# Patient Record
Sex: MALE | Race: WHITE | NOT HISPANIC OR LATINO | Employment: OTHER | URBAN - METROPOLITAN AREA
[De-identification: names, ages, dates, MRNs, and addresses within clinical notes are randomized per-mention and may not be internally consistent; named-entity substitution may affect disease eponyms.]

---

## 2017-07-25 ENCOUNTER — LAB VISIT (OUTPATIENT)
Dept: LAB | Facility: HOSPITAL | Age: 62
End: 2017-07-25
Attending: UROLOGY
Payer: COMMERCIAL

## 2017-07-25 ENCOUNTER — OFFICE VISIT (OUTPATIENT)
Dept: UROLOGY | Facility: CLINIC | Age: 62
End: 2017-07-25
Payer: COMMERCIAL

## 2017-07-25 VITALS
BODY MASS INDEX: 30.24 KG/M2 | HEART RATE: 61 BPM | SYSTOLIC BLOOD PRESSURE: 133 MMHG | WEIGHT: 216 LBS | HEIGHT: 71 IN | DIASTOLIC BLOOD PRESSURE: 70 MMHG | RESPIRATION RATE: 15 BRPM

## 2017-07-25 DIAGNOSIS — C61 PROSTATE CANCER: ICD-10-CM

## 2017-07-25 PROBLEM — E13.9 DIABETES MELLITUS DUE TO ABNORMAL INSULIN: Status: ACTIVE | Noted: 2017-07-25

## 2017-07-25 PROBLEM — L40.9 PSORIASIS: Status: ACTIVE | Noted: 2017-07-25

## 2017-07-25 PROBLEM — I10 HYPERTENSION: Status: ACTIVE | Noted: 2017-07-25

## 2017-07-25 LAB
CREAT SERPL-MCNC: 0.9 MG/DL
EST. GFR  (AFRICAN AMERICAN): >60 ML/MIN/1.73 M^2
EST. GFR  (NON AFRICAN AMERICAN): >60 ML/MIN/1.73 M^2

## 2017-07-25 PROCEDURE — 99999 PR PBB SHADOW E&M-NEW PATIENT-LVL IV: CPT | Mod: PBBFAC,,, | Performed by: UROLOGY

## 2017-07-25 PROCEDURE — 99205 OFFICE O/P NEW HI 60 MIN: CPT | Mod: S$GLB,,, | Performed by: UROLOGY

## 2017-07-25 PROCEDURE — 82565 ASSAY OF CREATININE: CPT

## 2017-07-25 PROCEDURE — 36415 COLL VENOUS BLD VENIPUNCTURE: CPT

## 2017-07-25 RX ORDER — METOPROLOL SUCCINATE 100 MG/1
100 TABLET, EXTENDED RELEASE ORAL DAILY
Refills: 1 | COMMUNITY
Start: 2017-07-10 | End: 2022-11-15

## 2017-07-25 RX ORDER — BLOOD-GLUCOSE METER
KIT MISCELLANEOUS
Refills: 3 | COMMUNITY
Start: 2017-07-10

## 2017-07-25 RX ORDER — SITAGLIPTIN AND METFORMIN HYDROCHLORIDE 500; 50 MG/1; MG/1
TABLET, FILM COATED ORAL
Refills: 1 | COMMUNITY
Start: 2017-07-10 | End: 2019-02-14

## 2017-07-25 RX ORDER — METHOTREXATE 2.5 MG/1
TABLET ORAL
Refills: 0 | COMMUNITY
Start: 2017-04-26 | End: 2020-11-23

## 2017-07-25 RX ORDER — TAMSULOSIN HYDROCHLORIDE 0.4 MG/1
CAPSULE ORAL
Refills: 3 | COMMUNITY
Start: 2017-07-10 | End: 2017-09-05

## 2017-07-25 RX ORDER — SERTRALINE HYDROCHLORIDE 50 MG/1
50 TABLET, FILM COATED ORAL DAILY
Refills: 1 | COMMUNITY
Start: 2017-07-10 | End: 2020-11-23

## 2017-07-25 RX ORDER — PRAVASTATIN SODIUM 40 MG/1
40 TABLET ORAL NIGHTLY
Refills: 1 | COMMUNITY
Start: 2017-07-10

## 2017-07-25 NOTE — PROGRESS NOTES
Clinic Note  7/25/2017      Subjective:         Chief Complaint:   CALEB Knott is a 62 y.o. male      No results found for: PSA, PSADIAG, PSATOTAL, PSAFREE, PSAFREEPCT   No past medical history on file.  No family history on file.  Social History     Social History    Marital status:      Spouse name: N/A    Number of children: N/A    Years of education: N/A     Occupational History    Not on file.     Social History Main Topics    Smoking status: Not on file    Smokeless tobacco: Not on file    Alcohol use Not on file    Drug use: Unknown    Sexual activity: Not on file     Other Topics Concern    Not on file     Social History Narrative    No narrative on file     No past surgical history on file.  Patient Active Problem List   Diagnosis    Prostate cancer     Review of Systems      Objective:      There were no vitals taken for this visit.  There is no height or weight on file to calculate BMI.  Physical Exam      Assessment and Plan:           Problem List Items Addressed This Visit     Prostate cancer      Other Visit Diagnoses    None.         Follow up:       Nickolas Kim

## 2017-07-25 NOTE — PROGRESS NOTES
Clinic Note  7/25/2017      Subjective:         Chief Complaint:   CALEB Knott is a 62 y.o. male recently diagnosed with prostate cancer.Here with his wife Xiomara. Friend of Carlton Flores.  Owns machine shop. Stopped smoking 3 years ago. Rides bike several times a week. BPH responded to Flomax.    Stage- T2a  PSAi- 6.7  PSAD- 0.35  Volume- 23 ccs  Biopsy (7/20/2017)- Right side [place] 3+4 4/6 positive ( % involvement); left benign. Overall 4/12 cores positive  TRISH score- 3  Intermediate risk tumor    No results found for: PSA, PSADIAG, PSATOTAL, PSAFREE, PSAFREEPCT   No past medical history on file.  No family history on file.  Social History     Social History    Marital status:      Spouse name: N/A    Number of children: N/A    Years of education: N/A     Occupational History    Not on file.     Social History Main Topics    Smoking status: Not on file    Smokeless tobacco: Not on file    Alcohol use Not on file    Drug use: Unknown    Sexual activity: Not on file     Other Topics Concern    Not on file     Social History Narrative    No narrative on file     No past surgical history on file.  Patient Active Problem List   Diagnosis    Prostate cancer     Review of Systems   Constitutional: Negative for appetite change, chills, fatigue, fever and unexpected weight change.   HENT: Negative for nosebleeds.    Respiratory: Negative for shortness of breath and wheezing.    Cardiovascular: Negative for chest pain, palpitations and leg swelling.   Gastrointestinal: Negative for abdominal distention, abdominal pain, constipation, diarrhea, nausea and vomiting.   Genitourinary: Positive for difficulty urinating. Negative for dysuria and hematuria.   Musculoskeletal: Negative for arthralgias and back pain.   Skin: Negative for pallor.   Neurological: Negative for dizziness, seizures and syncope.   Hematological: Negative for adenopathy.   Psychiatric/Behavioral: Negative for dysphoric  mood.         Objective:      There were no vitals taken for this visit.  There is no height or weight on file to calculate BMI.  Physical Exam   Constitutional: He is oriented to person, place, and time. He appears well-developed and well-nourished. No distress.   HENT:   Head: Atraumatic.   Neck: No tracheal deviation present.   Cardiovascular: Normal rate.    Pulmonary/Chest: Effort normal. No respiratory distress. He has no wheezes.   Abdominal: Soft. Bowel sounds are normal. He exhibits no distension and no mass. There is no tenderness. There is no rebound and no guarding.   Genitourinary: Rectum normal. Rectal exam shows no external hemorrhoid, no internal hemorrhoid, no mass and no tenderness.       Neurological: He is alert and oriented to person, place, and time.   Skin: Skin is warm and dry. He is not diaphoretic.     Psychiatric: He has a normal mood and affect. His behavior is normal. Judgment and thought content normal.         Assessment and Plan:           Problem List Items Addressed This Visit     Prostate cancer      Other Visit Diagnoses    None.         Follow up:   Today's visit was spent almost entirely on counseling. We reviewed his diagnosis, stage, grade, risk group, and prognosis. We discussed [place] and TRISH risk stratification We reviewed the [facility] nomogram. We discussed the concept of low risk, moderate risk, and high risk disease. We discussed the different treatment options including active surveillance (as well as the surveillance protocol), prostate brachytherapy, IMRT, IGRT, cryotherapy, and both open and robotic prostatectomy.We also discussed the advantages, disadvantages, risks and benefits, as well as complications of each option. Regarding radiation therapy we discussed treatment planning, the different techniques, short and long term complications. These included radiation cystitis, radiation proctitis, and impotence. We discussed success, failure, and salvage  therapeutic options. We discussed surgical therapy in depth including preoperative preparation, surgical technique (including bladder neck and nerve-sparing techniques), postoperative recuperation and recovery, and short and long term complications including UTI, bleeding, blood clots,catheter dislodgement, etc. We discussed the risks of reoperation, incontinence, impotence, and recurrence. We discussed preop and postop Kegels, post op penile rehab, and treatment options for incontinence and impotence. We discussed rates of cancer free survival and recurrence, as well as salvage therapeutic options. We discussed the possible  indications for adjuvant radiation therapy. I answered questions and addressed concerns.   Discussed MRI and Prolaris test.  Need outside slides for Prolaris test.  I spent 30 minutes with the patient. Over 50% of the visit was spent in counseling.   primary care physician- Mike Larson in [facility], [phone].  My nurse will instruct the patient on Kegel exercises today and give them the Kegel exercise instruction sheet.   Nickolas Kim

## 2017-08-03 ENCOUNTER — TELEPHONE (OUTPATIENT)
Dept: UROLOGY | Facility: CLINIC | Age: 62
End: 2017-08-03

## 2017-08-03 ENCOUNTER — OFFICE VISIT (OUTPATIENT)
Dept: UROLOGY | Facility: CLINIC | Age: 62
End: 2017-08-03
Payer: COMMERCIAL

## 2017-08-03 ENCOUNTER — HOSPITAL ENCOUNTER (OUTPATIENT)
Dept: RADIOLOGY | Facility: HOSPITAL | Age: 62
Discharge: HOME | End: 2017-08-03
Attending: UROLOGY
Payer: COMMERCIAL

## 2017-08-03 VITALS
DIASTOLIC BLOOD PRESSURE: 63 MMHG | BODY MASS INDEX: 30.74 KG/M2 | WEIGHT: 219.56 LBS | HEIGHT: 71 IN | SYSTOLIC BLOOD PRESSURE: 109 MMHG | HEART RATE: 64 BPM | RESPIRATION RATE: 15 BRPM

## 2017-08-03 DIAGNOSIS — C61 PROSTATE CANCER: Primary | ICD-10-CM

## 2017-08-03 DIAGNOSIS — C61 PROSTATE CANCER: ICD-10-CM

## 2017-08-03 PROCEDURE — 72197 MRI PELVIS W/O & W/DYE: CPT | Mod: TC

## 2017-08-03 PROCEDURE — 99213 OFFICE O/P EST LOW 20 MIN: CPT | Mod: S$GLB,,, | Performed by: UROLOGY

## 2017-08-03 PROCEDURE — 72197 MRI PELVIS W/O & W/DYE: CPT | Mod: 26,,, | Performed by: RADIOLOGY

## 2017-08-03 PROCEDURE — 99999 PR PBB SHADOW E&M-EST. PATIENT-LVL III: CPT | Mod: PBBFAC,,, | Performed by: UROLOGY

## 2017-08-03 PROCEDURE — 25500020 PHARM REV CODE 255: Performed by: UROLOGY

## 2017-08-03 PROCEDURE — A9585 GADOBUTROL INJECTION: HCPCS | Performed by: UROLOGY

## 2017-08-03 PROCEDURE — 88321 CONSLTJ&REPRT SLD PREP ELSWR: CPT | Mod: ,,, | Performed by: PATHOLOGY

## 2017-08-03 PROCEDURE — 3008F BODY MASS INDEX DOCD: CPT | Mod: S$GLB,,, | Performed by: UROLOGY

## 2017-08-03 RX ORDER — GADOBUTROL 604.72 MG/ML
10 INJECTION INTRAVENOUS
Status: COMPLETED | OUTPATIENT
Start: 2017-08-03 | End: 2017-08-03

## 2017-08-03 RX ADMIN — GADOBUTROL 10 ML: 604.72 INJECTION INTRAVENOUS at 07:08

## 2017-08-03 NOTE — LETTER
August 3, 2017        Mike Larson MD  [address] [facility] [address]             [facility]  [address]  [place] [address]  Phone: [phone]   Patient: Bert Knott   MR Number: [id number]   YOB: 1955   Date of Visit: 8/3/2017       Dear Dr. Larson:    Thank you for referring Bert Knott to me for evaluation. Attached you will find relevant portions of my assessment and plan of care.    If you have questions, please do not hesitate to call me. I look forward to following Bert Knott along with you.    Sincerely,      Nickolas Kim MD            CC  No Recipients    Enclosure

## 2017-08-03 NOTE — PROGRESS NOTES
Clinic Note  8/3/2017      Subjective:         Chief Complaint:   CALEB Knott is a 62 y.o. male recently diagnosed with prostate cancer.Here with his wife Xiomara. Friend of Carlton Flores.  Owns machine shop. Stopped smoking 3 years ago. Rides bike several times a week. BPH responded to Flomax.     Stage- T2a  PSAi- 6.7  PSAD- 0.35  Volume- 23 ccs  Biopsy (7/20/2017)- Right side [place] 3+4 4/6 positive ( % involvement); left benign. Overall 4/12 cores positive  TRISH score- 3  Intermediate risk tumor    MRI- no nodes, extracapsular extension, SV involvement.      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 7/25/2017    Elevated PSA     Hypertension      Family History   Problem Relation Age of Onset    Hypertension Father     Cancer Mother     Cancer Paternal Grandmother     Prostate cancer Paternal Grandfather     Cancer Maternal Grandmother      Social History     Social History    Marital status:      Spouse name: N/A    Number of children: N/A    Years of education: N/A     Occupational History          Social History Main Topics    Smoking status: Former Smoker     Packs/day: 1.50     Types: Vaping w/o nicotine, Cigarettes     Start date: 1972     Quit date: 2014    Smokeless tobacco: Not on file    Alcohol use No    Drug use: No    Sexual activity: Yes     Partners: Female     Other Topics Concern    Not on file     Social History Narrative    No narrative on file     Past Surgical History:   Procedure Laterality Date    BACK SURGERY  1974    lamputation of little finger tip  1979    MOUTH SURGERY  1972    tonsilectomy  1960     Patient Active Problem List   Diagnosis    Prostate cancer    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   HENT: Negative for nosebleeds.    Respiratory: Negative for shortness of breath and wheezing.    Cardiovascular: Negative for chest pain, palpitations and leg swelling.   Gastrointestinal: Negative for abdominal distention, abdominal pain, constipation, diarrhea, nausea and vomiting.   Musculoskeletal: Negative for arthralgias and back pain.   Skin: Negative for pallor.   Neurological: Negative for dizziness, seizures and syncope.   Hematological: Negative for adenopathy.   Psychiatric/Behavioral: Negative for dysphoric mood.         Objective:      There were no vitals taken for this visit.  Estimated body mass index is 30.13 kg/m² as calculated from the following:    Height as of 7/25/17: 5' 11" (1.803 m).    Weight as of 7/25/17: 98 kg (216 lb).  Physical Exam      Assessment and Plan:           Problem List Items Addressed This Visit     Prostate cancer - Primary      Other Visit Diagnoses    None.         Follow up:   Today's visit was spent almost entirely on counseling. We reviewed his diagnosis, stage, grade, risk group, and prognosis. We discussed D'[place] and TRISH risk stratification We reviewed the [facility] nomogram. We discussed the concept of low risk, moderate risk, and high risk disease. We discussed the different treatment options including active surveillance (as well as the surveillance protocol), prostate brachytherapy, IMRT, IGRT, cryotherapy, and both open and robotic prostatectomy.We also discussed the advantages, disadvantages, risks and benefits, as well as complications of each option. Regarding radiation therapy we discussed treatment planning, the different techniques, short and long term complications. These included radiation cystitis, radiation proctitis, and impotence. We discussed success, failure, and salvage therapeutic options. We discussed surgical " therapy in depth including preoperative preparation, surgical technique (including bladder neck and nerve-sparing techniques), postoperative recuperation and recovery, and short and long term complications including UTI, bleeding, blood clots,catheter dislodgement, etc. We discussed the risks of reoperation, incontinence, impotence, and recurrence. We discussed preop and postop Kegels, post op penile rehab, and treatment options for incontinence and impotence. We discussed rates of cancer free survival and recurrence, as well as salvage therapeutic options. We discussed the possible  indications for adjuvant radiation therapy. I answered questions and addressed concerns.   Patient interested in robotic assisted laparoscopic prostatectomy. Will call with [facility].  The patient will meet with our  Priscila today to find a date for surgery and begin preparation for surgery. Will also receive our handout on NPO guidelines and preop hydration. Patient will also receive information and education on preoperative skin preparation and postop wound care.  Xiomara is concerned that his Zoloft dose may be adjusted. Recommend he discuss with Dr. Larson.  Letter to Dr. Larson.  I spent 25 minutes with the patient of which more than half was spent in direct consultation with the patient in regards to our treatment and plan.         Nickolas Kim

## 2017-08-22 ENCOUNTER — OFFICE VISIT (OUTPATIENT)
Dept: UROLOGY | Facility: CLINIC | Age: 62
End: 2017-08-22
Payer: COMMERCIAL

## 2017-08-22 ENCOUNTER — HOSPITAL ENCOUNTER (OUTPATIENT)
Dept: CARDIOLOGY | Facility: CLINIC | Age: 62
Discharge: HOME | End: 2017-08-22
Payer: COMMERCIAL

## 2017-08-22 ENCOUNTER — ANESTHESIA EVENT (OUTPATIENT)
Dept: SURGERY | Facility: HOSPITAL | Age: 62
DRG: 708 | End: 2017-08-22
Payer: COMMERCIAL

## 2017-08-22 ENCOUNTER — HOSPITAL ENCOUNTER (OUTPATIENT)
Dept: PREADMISSION TESTING | Facility: HOSPITAL | Age: 62
Discharge: HOME | End: 2017-08-22
Attending: ANESTHESIOLOGY
Payer: COMMERCIAL

## 2017-08-22 VITALS
BODY MASS INDEX: 30.35 KG/M2 | WEIGHT: 216.81 LBS | DIASTOLIC BLOOD PRESSURE: 83 MMHG | HEART RATE: 57 BPM | HEIGHT: 71 IN | OXYGEN SATURATION: 99 % | RESPIRATION RATE: 18 BRPM | SYSTOLIC BLOOD PRESSURE: 165 MMHG | TEMPERATURE: 98 F

## 2017-08-22 DIAGNOSIS — Z01.812 PRE-PROCEDURE LAB EXAM: ICD-10-CM

## 2017-08-22 DIAGNOSIS — Z01.818 PREOP TESTING: Primary | ICD-10-CM

## 2017-08-22 DIAGNOSIS — Z01.818 PREOP TESTING: ICD-10-CM

## 2017-08-22 DIAGNOSIS — C61 PROSTATE CANCER: ICD-10-CM

## 2017-08-22 DIAGNOSIS — E08.9 DIABETES MELLITUS DUE TO UNDERLYING CONDITION WITHOUT COMPLICATION, WITHOUT LONG-TERM CURRENT USE OF INSULIN: Primary | ICD-10-CM

## 2017-08-22 PROCEDURE — 99999 PR PBB SHADOW E&M-EST. PATIENT-LVL II: CPT | Mod: PBBFAC,,,

## 2017-08-22 PROCEDURE — 93000 ELECTROCARDIOGRAM COMPLETE: CPT | Mod: S$GLB,,, | Performed by: INTERNAL MEDICINE

## 2017-08-22 PROCEDURE — 99499 UNLISTED E&M SERVICE: CPT | Mod: S$GLB,,, | Performed by: STUDENT IN AN ORGANIZED HEALTH CARE EDUCATION/TRAINING PROGRAM

## 2017-08-22 NOTE — ANESTHESIA PREPROCEDURE EVALUATION
Anesthesia Assessment: Preoperative EQUATION     Planned Procedure: Procedure(s) (LRB):  ROBOTIC ASSISTED LAPAROSCOPIC PROSTATECTOMY (N/A)  Requested Anesthesia Type:General  Surgeon: Nickolas Kim MD  Service: Urology  Known or anticipated Date of Surgery:8/28/2017     Surgeon notes: reviewed     Electronic QUestionnaire Assessment completed via nurse interview with patient     NO AQ     Triage considerations:      The patient has no apparent active cardiac condition (No unstable coronary Syndrome such as severe unstable angina or recent [<1 month] myocardial infarction, decompensated CHF, severe valvular   disease or significant arrhythmia)     Previous anesthesia records:GETA and Not available     Last PCP note: outside [facility]      Other important co-morbidities:  COPD, DM-2, HTN     Tests already available:  Available tests,  within 3 months , within [facility] .  7/2017 UA, Cr                                                Instructions given. (See in Nurse's note)     Optimization:  Anesthesia Preop Clinic Assessment  Indicated-Indicated for this surgery                          Plan:              Testing:  Hematology Profile, CMP, A1C, T&S and EKG   Pre-anesthesia  visit  8/22                                                                    Visit focus: concerns in complex and/or prolonged anesthesia                                                                                              Patient  has previously scheduled Medical Appointment: 8/22 Pre-op Urology     Navigation: Tests Scheduled. Heme Profile, CMP, A1C, T&S, EKG  8/22                                              Results will be tracked by Preop Clinic.     Malaika Aguilar RN                                                                                                                 08/22/2017  Pre-operative evaluation for Procedure(s) (LRB):  ROBOTIC ASSISTED LAPAROSCOPIC PROSTATECTOMY (N/A)    Bert Knott is a 62 y.o. male  former smoker with PMH of COPD, DM II, HTN, Psoriasis, and recent diagnosis of prostate cancer who presents for above procedure.       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    Hypertension    Psoriasis       Review of patient's allergies indicates:   Allergen Reactions    Penicillins Rash    Wellbutrin [bupropion hcl] Rash        Current Outpatient Prescriptions on File Prior to Encounter   Medication Sig Dispense Refill    FREESTYLE LITE STRIPS Strp TEST BLOOD SUGAR D  3    JANUMET  mg per tablet TK 1 T PO BID  1    methotrexate 2.5 MG Tab TK 10 TS PO WEEKLY  0    metoprolol succinate (TOPROL-XL) 100 MG 24 hr tablet TK 1 T PO D  1    pravastatin (PRAVACHOL) 40 MG tablet TK 1 T PO D  1    sertraline (ZOLOFT) 50 MG tablet TK 1 T PO D  1    tamsulosin (FLOMAX) 0.4 mg Cp24 TK 1 C PO D  3     No current facility-administered medications on file prior to encounter.        Past Surgical History:   Procedure Laterality Date    BACK SURGERY  1974    lamputation of little finger tip  1979    MOUTH SURGERY  1972    tonsilectomy  1960       Social History     Social History    Marital status:      Spouse name: N/A    Number of children: N/A    Years of education: N/A     Occupational History          Social History Main Topics    Smoking status: Former Smoker     Packs/day: 1.50     Types: Cigarettes, Vaping with nicotine     Start date: 1972     Quit date: 2014    Smokeless tobacco: Not on file    Alcohol use No    Drug use: No    Sexual activity: Yes     Partners: Female     Other Topics Concern    Not on file     Social History Narrative    No narrative on file         Vital Signs Range (Last 24H):         CBC:   Recent Labs      08/22/17   1405   WBC  6.15   RBC  4.36*   HGB  14.1   HCT  40.2   PLT  271   MCV  92   MCH  32.3*   MCHC  35.1       CMP:   Recent Labs      08/22/17    1405   NA  142   K  4.5   CL  104   CO2  31*   BUN  14   CREATININE  1.0   GLU  131*   CALCIUM  9.8   ALBUMIN  4.0   PROT  7.3   ALKPHOS  77   ALT  45*   AST  35   BILITOT  1.0       INR  No results for input(s): INR, PROTIME, APTT in the last 72 hours.    Invalid input(s): PT      EKG:   Sinus bradycardia- 50  Otherwise normal ECG  No previous ECGs available  Confirmed by JOHANN MCKEON MD ([id number]) on 8/22/2017 8:05:09 PM    2D Echo: None        Anesthesia Evaluation    I have reviewed the Patient Summary Reports.    I have reviewed the Nursing Notes.      Review of Systems  Anesthesia Hx:  No problems with previous Anesthesia  History of prior surgery of interest to airway management or planning: Denies Family Hx of Anesthesia complications.   Denies Personal Hx of Anesthesia complications.   Social:  Former Smoker    Hematology/Oncology:         -- Denies Anemia: Current/Recent Cancer. Other (see Oncology comments)   EENT/Dental:EENT/Dental Normal   Cardiovascular:   Hypertension, well controlled Denies MI.  Denies CAD.     Denies Angina.            Pulmonary:   COPD, mild Denies Asthma.  Denies Shortness of breath.  Denies Sleep Apnea.    Renal/:   Denies Chronic Renal Disease.     Hepatic/GI:   Denies GERD. Denies Liver Disease.    Neurological:   Denies CVA. Denies Seizures. - reported single seizure in 1998 and told by his physician it was secondary to lack of sleep.    Endocrine:   Diabetes, well controlled, type 2    Psych:   anxiety - panic attack on friday         Physical Exam  General:  Well nourished    Airway/Jaw/Neck:  Airway Findings: Mouth Opening: Normal Tongue: Normal  General Airway Assessment: Adult  Mallampati: III  Improves to III, II with phonation.  TM Distance: 4 - 6 cm  Jaw/Neck Findings:     Neck ROM: Normal ROM     Eyes/Ears/Nose:  EYES/EARS/NOSE FINDINGS: Normal   Dental:  Dental Findings: upper front caps, In tact   Chest/Lungs:  Chest/Lungs Clear    Heart/Vascular:  Heart Findings:  Normal Heart murmur: negative       Mental Status:  Mental Status Findings:  Cooperative, Alert and Oriented         Anesthesia Plan  Type of Anesthesia, risks & benefits discussed:  Anesthesia Type:  general  Patient's Preference:   Intra-op Monitoring Plan: standard ASA monitors  Intra-op Monitoring Plan Comments:   Post Op Pain Control Plan: multimodal analgesia, IV/PO Opioids PRN and per primary service following discharge from PACU  Post Op Pain Control Plan Comments:   Induction:   IV  Beta Blocker:  Patient is on a Beta-Blocker and has received one dose within the past 24 hours (No further documentation required).       Informed Consent: Patient understands risks and agrees with Anesthesia plan.  Questions answered. Anesthesia consent signed with patient.  ASA Score: 3     Day of Surgery Review of History & Physical: I have interviewed and examined the patient. I have reviewed the patient's H&P dated:   Significant changes noted: Surgeon notified.  H&P update referred to the surgeon.         Ready For Surgery From Anesthesia Perspective.

## 2017-08-22 NOTE — PRE ADMISSION SCREENING
Anesthesia Assessment: Preoperative EQUATION    Planned Procedure: Procedure(s) (LRB):  ROBOTIC ASSISTED LAPAROSCOPIC PROSTATECTOMY (N/A)  Requested Anesthesia Type:General  Surgeon: Nickolas Kim MD  Service: Urology  Known or anticipated Date of Surgery:8/28/2017    Surgeon notes: reviewed    Electronic QUestionnaire Assessment completed via nurse interview with patient    NO AQ    Triage considerations:     The patient has no apparent active cardiac condition (No unstable coronary Syndrome such as severe unstable angina or recent [<1 month] myocardial infarction, decompensated CHF, severe valvular   disease or significant arrhythmia)    Previous anesthesia records:GETA and Not available    Last PCP note: outside [facility]     Other important co-morbidities:  COPD, DM-2, HTN     Tests already available:  Available tests,  within 3 months , within [facility] .  7/2017 UA, Cr            Instructions given. (See in Nurse's note)    Optimization:  Anesthesia Preop Clinic Assessment  Indicated-Indicated for this surgery        Plan:    Testing:  Hematology Profile, CMP, A1C, T&S and EKG   Pre-anesthesia  visit  8/22     Visit focus: concerns in complex and/or prolonged anesthesia          Patient  has previously scheduled Medical Appointment: 8/22 Pre-op Urology    Navigation: Tests Scheduled. Heme Profile, CMP, A1C, T&S, EKG  8/22             Results will be tracked by Preop Clinic.  8/23 Lab, EKG reviewed    Malaika Aguilar RN

## 2017-08-22 NOTE — DISCHARGE INSTRUCTIONS
Your surgery has been scheduled for:___________8/28/2017_______________________________    You should report to:  ____[facility], located on the [place] side of the first floor of the           [facility] ([phone])  _X___[facility], located on the [facility] side of the            Second floor of the [facility] ([phone])  ____3rd Floor [facility] located on the [facility] side of the [facility] [phone]  Please Note   - Tell your doctor if you take Aspirin, products containing Aspirin, herbal medications  or blood thinners, such as Coumadin, Ticlid, or Plavix.  (Consult your provider regarding holding or stopping before surgery).  - Arrange for someone to drive you home following surgery.  You will not be allowed to leave the surgical facility alone or drive yourself home following sedation and anesthesia.  Before Surgery  - Stop taking all herbal medications 14days prior to surgery  - No Motrin/Advil (Ibuprofen) 7 days before surgery  - No Aleve (Naproxen) 7 days before surgery  - Stop Taking Asprin, products containing Asprin __7___days before surgery  - Stop taking blood thinners___N/A____days before surgery  - Refrain from drinking alcoholic beverages for 24hours before and after surgery  - Stop or limit smoking ____5_____days before surgery  Night before Surgery  - DO NOT EAT OR DRINK ANYTHING AFTER MIDNIGHT, INCLUDING GUM, HARD CANDY, MINTS, OR CHEWING TOBACCO.  - Take a shower or bath (shower is recommended).  Bathe with Hibiclens soap or an antibacterial soap from the neck down.  If not supplied by your surgeon, hibiclens soap will need to be purchased over the counter in pharmacy.  Rinse soap off thoroughly.  - Shampoo your hair with your regular shampoo  The Day of Surgery  - Take another bath or shower with hibiclens or any antibacterial soap, to reduce the chance of infection.  - Take heart  and blood pressure medications with a small sip of water, as advised by the perioperative team.  - Do not take fluid pills  - You may brush your teeth and rinse your mouth, but do not swall any additional water.   - Do not apply perfumes, powder, body lotions or deodorant on the day of surgery.  - Do not wear makeup or moisturizer  - Wear comfortable clothes, such as a button front shirt and loose fitting pants.  - Leave all jewelry, including body piercings, and valuables at home.    - Bring any devices you will neeed after surgery such as crutches or canes.  - If you have sleep apnea, please bring your CPAP machine  In the event that your physical condition changes including the onset of a cold or respiratory illness, or if you have to delay or cancel your surgery, please notify your surgeon.

## 2017-08-22 NOTE — PROGRESS NOTES
Urology ([facility]) H&P for upcoming procedure  Staff:  Dr. Nickolas Kim MD    Is this patient in a research study?  No    CC: prostate adencarcinoma    HPI:  Bert Knott is a 62 y.o. male with history of DM, HTN who was found to have [id number] [place] 3+4=7 prostate adenocarcinoma. He is continent and does have some urinary symptoms, most notably frequency q2h during the day and nocturia 3x/night. He says this is much improved from previous now that he is on flomax. He does have mild ED and low libido. He says he is taking cialis which helps him. He does not have any bone pain, chest pain or shortness of breath.     The patient initially presented with elevated PSA.  TRUS biopsy revealed the following:        LEFT   RIGHT  BASE   benign   0/2, high grade intraepithelial neoplasia  MID   benign   2/2, (3+4), 100%  APEX   benign   2/2, (3+4), 65%    Date of Biopsy: 7/20/2017  PSA: 6.7  Volume: 23 g  Voiding complaints: present  ED: present  Incontinence: absent    ROS:  Neg except per HPI, specifically no bone pain, no unintentional weight loss, no anorexia, no night sweats    Past Medical History:   Diagnosis Date    Allergy     COPD (chronic obstructive pulmonary disease)     Diabetes mellitus     Diabetes mellitus due to abnormal insulin 7/25/2017    Elevated PSA     Hypertension        Past Surgical History:   Procedure Laterality Date    BACK SURGERY  1974    lamputation of little finger tip  1979    MOUTH SURGERY  1972    tonsilectomy  1960       Social History     Social History    Marital status:      Spouse name: N/A    Number of children: N/A    Years of education: N/A     Occupational History          Social History Main Topics    Smoking status: Former Smoker     Packs/day: 1.50     Types: Cigarettes, Vaping with nicotine     Start date: 1972     Quit date: 2014    Smokeless tobacco: Not on file    Alcohol use No    Drug use: No    Sexual activity: Yes      Partners: Female     Other Topics Concern    Not on file     Social History Narrative    No narrative on file       Family History   Problem Relation Age of Onset    Hypertension Father     Cancer Mother     Cancer Paternal Grandmother     Prostate cancer Paternal Grandfather     Cancer Maternal Grandmother        Review of patient's allergies indicates:   Allergen Reactions    Penicillins Rash    Wellbutrin [bupropion hcl] Rash       Current Outpatient Prescriptions on File Prior to Visit   Medication Sig Dispense Refill    FREESTYLE LITE STRIPS Strp TEST BLOOD SUGAR D  3    JANUMET  mg per tablet TK 1 T PO BID  1    methotrexate 2.5 MG Tab TK 10 TS PO WEEKLY  0    metoprolol succinate (TOPROL-XL) 100 MG 24 hr tablet TK 1 T PO D  1    pravastatin (PRAVACHOL) 40 MG tablet TK 1 T PO D  1    sertraline (ZOLOFT) 50 MG tablet TK 1 T PO D  1    tamsulosin (FLOMAX) 0.4 mg Cp24 TK 1 C PO D  3     No current facility-administered medications on file prior to visit.        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    Lab Results   Component Value Date    WBC 6.15 08/22/2017    HGB 14.1 08/22/2017    HCT 40.2 08/22/2017    MCV 92 08/22/2017     08/22/2017       BMP  Lab Results   Component Value Date    CREATININE 0.9 07/25/2017    ESTGFRAFRICA >60.0 07/25/2017    EGFRNONAA >60.0 07/25/2017       No results found for: PSA, PSADIAG, PSATOTAL, PSAFREE, PSAFREEPCT    Imaging:  Prostate MRI 8/3/17: T2 hypointense lesion on right mid peripheral prostate gland,  PIRADS 5 lesion, no extracapsular extension, no lymphadenopathy    Assessment: Bert Knott is a 62 y.o. male with [id number] [place] 3+4=7 prostate adenocarcinoma.      Plan:   1. To OR on 8/28/2017 for RALP without BPLND  2. Consents signed   3. I have explained the risk, benefits, and alternatives of the procedure in detail. The patient voices understanding and all questions have been answered. The patient agrees to proceed as planned.       Alexandro Ayala MD

## 2017-08-25 ENCOUNTER — TELEPHONE (OUTPATIENT)
Dept: UROLOGY | Facility: CLINIC | Age: 62
End: 2017-08-25

## 2017-08-28 ENCOUNTER — HOSPITAL ENCOUNTER (INPATIENT)
Facility: HOSPITAL | Age: 62
LOS: 1 days | Discharge: HOME | DRG: 708 | End: 2017-08-29
Attending: UROLOGY | Admitting: UROLOGY
Payer: COMMERCIAL

## 2017-08-28 ENCOUNTER — ANESTHESIA (OUTPATIENT)
Dept: SURGERY | Facility: HOSPITAL | Age: 62
DRG: 708 | End: 2017-08-28
Payer: COMMERCIAL

## 2017-08-28 ENCOUNTER — SURGERY (OUTPATIENT)
Age: 62
End: 2017-08-28

## 2017-08-28 DIAGNOSIS — C61 PROSTATE CANCER: ICD-10-CM

## 2017-08-28 LAB
ABO + RH BLD: NORMAL
ALBUMIN SERPL BCP-MCNC: 3.4 G/DL
ALP SERPL-CCNC: 59 U/L
ALT SERPL W/O P-5'-P-CCNC: 22 U/L
ANION GAP SERPL CALC-SCNC: 8 MMOL/L
AST SERPL-CCNC: 16 U/L
BASOPHILS # BLD AUTO: 0.01 K/UL
BASOPHILS NFR BLD: 0.1 %
BILIRUB SERPL-MCNC: 0.7 MG/DL
BLD GP AB SCN CELLS X3 SERPL QL: NORMAL
BUN SERPL-MCNC: 13 MG/DL
CALCIUM SERPL-MCNC: 8.2 MG/DL
CHLORIDE SERPL-SCNC: 109 MMOL/L
CO2 SERPL-SCNC: 22 MMOL/L
CREAT SERPL-MCNC: 1.1 MG/DL
DIFFERENTIAL METHOD: ABNORMAL
EOSINOPHIL # BLD AUTO: 0 K/UL
EOSINOPHIL NFR BLD: 0 %
ERYTHROCYTE [DISTWIDTH] IN BLOOD BY AUTOMATED COUNT: 13.4 %
EST. GFR  (AFRICAN AMERICAN): >60 ML/MIN/1.73 M^2
EST. GFR  (NON AFRICAN AMERICAN): >60 ML/MIN/1.73 M^2
GLUCOSE SERPL-MCNC: 180 MG/DL (ref 70–110)
GLUCOSE SERPL-MCNC: 276 MG/DL
HCO3 UR-SCNC: 20.2 MMOL/L (ref 24–28)
HCT VFR BLD AUTO: 34.4 %
HCT VFR BLD CALC: 33 %PCV (ref 36–54)
HGB BLD-MCNC: 12.1 G/DL
LYMPHOCYTES # BLD AUTO: 0.5 K/UL
LYMPHOCYTES NFR BLD: 4.7 %
MCH RBC QN AUTO: 31.9 PG
MCHC RBC AUTO-ENTMCNC: 35.2 G/DL
MCV RBC AUTO: 91 FL
MONOCYTES # BLD AUTO: 0.8 K/UL
MONOCYTES NFR BLD: 7.8 %
NEUTROPHILS # BLD AUTO: 8.9 K/UL
NEUTROPHILS NFR BLD: 87.2 %
PCO2 BLDA: 36.7 MMHG (ref 35–45)
PH SMN: 7.35 [PH] (ref 7.35–7.45)
PLATELET # BLD AUTO: 216 K/UL
PMV BLD AUTO: 10.4 FL
PO2 BLDA: 100 MMHG (ref 80–100)
POC BE: -5 MMOL/L
POC IONIZED CALCIUM: 1.12 MMOL/L (ref 1.06–1.42)
POC SATURATED O2: 97 % (ref 95–100)
POC TCO2: 21 MMOL/L (ref 23–27)
POCT GLUCOSE: 167 MG/DL (ref 70–110)
POCT GLUCOSE: 210 MG/DL (ref 70–110)
POCT GLUCOSE: 222 MG/DL (ref 70–110)
POTASSIUM BLD-SCNC: 4 MMOL/L (ref 3.5–5.1)
POTASSIUM SERPL-SCNC: 4.7 MMOL/L
PROT SERPL-MCNC: 6 G/DL
RBC # BLD AUTO: 3.79 M/UL
SAMPLE: ABNORMAL
SODIUM BLD-SCNC: 140 MMOL/L (ref 136–145)
SODIUM SERPL-SCNC: 139 MMOL/L
WBC # BLD AUTO: 10.24 K/UL

## 2017-08-28 PROCEDURE — 0TBC4ZX EXCISION OF BLADDER NECK, PERCUTANEOUS ENDOSCOPIC APPROACH, DIAGNOSTIC: ICD-10-PCS | Performed by: UROLOGY

## 2017-08-28 PROCEDURE — 71000039 HC RECOVERY, EACH ADD'L HOUR: Performed by: UROLOGY

## 2017-08-28 PROCEDURE — P9045 ALBUMIN (HUMAN), 5%, 250 ML: HCPCS

## 2017-08-28 PROCEDURE — 38571 LAPAROSCOPY LYMPHADENECTOMY: CPT | Mod: 51,,, | Performed by: UROLOGY

## 2017-08-28 PROCEDURE — 0VT34ZZ RESECTION OF BILATERAL SEMINAL VESICLES, PERCUTANEOUS ENDOSCOPIC APPROACH: ICD-10-PCS | Performed by: UROLOGY

## 2017-08-28 PROCEDURE — 0VT04ZZ RESECTION OF PROSTATE, PERCUTANEOUS ENDOSCOPIC APPROACH: ICD-10-PCS | Performed by: UROLOGY

## 2017-08-28 PROCEDURE — 88305 TISSUE EXAM BY PATHOLOGIST: CPT | Mod: 26,,, | Performed by: PATHOLOGY

## 2017-08-28 PROCEDURE — 94761 N-INVAS EAR/PLS OXIMETRY MLT: CPT

## 2017-08-28 PROCEDURE — 88307 TISSUE EXAM BY PATHOLOGIST: CPT | Mod: 26,,, | Performed by: PATHOLOGY

## 2017-08-28 PROCEDURE — 27201423 OPTIME MED/SURG SUP & DEVICES STERILE SUPPLY: Performed by: UROLOGY

## 2017-08-28 PROCEDURE — 63600175 PHARM REV CODE 636 W HCPCS: Performed by: UROLOGY

## 2017-08-28 PROCEDURE — D9220A PRA ANESTHESIA: Mod: ,,, | Performed by: ANESTHESIOLOGY

## 2017-08-28 PROCEDURE — 55866 LAPS SURG PRST8ECT RPBIC RAD: CPT | Mod: ,,, | Performed by: UROLOGY

## 2017-08-28 PROCEDURE — 37000008 HC ANESTHESIA 1ST 15 MINUTES: Performed by: UROLOGY

## 2017-08-28 PROCEDURE — 36000712 HC OR TIME LEV V 1ST 15 MIN: Performed by: UROLOGY

## 2017-08-28 PROCEDURE — 27000221 HC OXYGEN, UP TO 24 HOURS

## 2017-08-28 PROCEDURE — 63600175 PHARM REV CODE 636 W HCPCS

## 2017-08-28 PROCEDURE — 82962 GLUCOSE BLOOD TEST: CPT | Performed by: UROLOGY

## 2017-08-28 PROCEDURE — 25000003 PHARM REV CODE 250: Performed by: STUDENT IN AN ORGANIZED HEALTH CARE EDUCATION/TRAINING PROGRAM

## 2017-08-28 PROCEDURE — 36620 INSERTION CATHETER ARTERY: CPT | Mod: 59,,, | Performed by: ANESTHESIOLOGY

## 2017-08-28 PROCEDURE — A4216 STERILE WATER/SALINE, 10 ML: HCPCS | Performed by: STUDENT IN AN ORGANIZED HEALTH CARE EDUCATION/TRAINING PROGRAM

## 2017-08-28 PROCEDURE — 8E0W4CZ ROBOTIC ASSISTED PROCEDURE OF TRUNK REGION, PERCUTANEOUS ENDOSCOPIC APPROACH: ICD-10-PCS | Performed by: UROLOGY

## 2017-08-28 PROCEDURE — 11000001 HC ACUTE MED/SURG PRIVATE ROOM

## 2017-08-28 PROCEDURE — 88305 TISSUE EXAM BY PATHOLOGIST: CPT | Performed by: PATHOLOGY

## 2017-08-28 PROCEDURE — 71000033 HC RECOVERY, INTIAL HOUR: Performed by: UROLOGY

## 2017-08-28 PROCEDURE — 63600175 PHARM REV CODE 636 W HCPCS: Performed by: STUDENT IN AN ORGANIZED HEALTH CARE EDUCATION/TRAINING PROGRAM

## 2017-08-28 PROCEDURE — 86901 BLOOD TYPING SEROLOGIC RH(D): CPT

## 2017-08-28 PROCEDURE — 86900 BLOOD TYPING SEROLOGIC ABO: CPT

## 2017-08-28 PROCEDURE — 07BC4ZX EXCISION OF PELVIS LYMPHATIC, PERCUTANEOUS ENDOSCOPIC APPROACH, DIAGNOSTIC: ICD-10-PCS | Performed by: UROLOGY

## 2017-08-28 PROCEDURE — 25000003 PHARM REV CODE 250: Performed by: UROLOGY

## 2017-08-28 PROCEDURE — 85025 COMPLETE CBC W/AUTO DIFF WBC: CPT

## 2017-08-28 PROCEDURE — 36000713 HC OR TIME LEV V EA ADD 15 MIN: Performed by: UROLOGY

## 2017-08-28 PROCEDURE — 80053 COMPREHEN METABOLIC PANEL: CPT

## 2017-08-28 PROCEDURE — 37000009 HC ANESTHESIA EA ADD 15 MINS: Performed by: UROLOGY

## 2017-08-28 PROCEDURE — 36415 COLL VENOUS BLD VENIPUNCTURE: CPT

## 2017-08-28 RX ORDER — DEXAMETHASONE SODIUM PHOSPHATE 4 MG/ML
INJECTION, SOLUTION INTRA-ARTICULAR; INTRALESIONAL; INTRAMUSCULAR; INTRAVENOUS; SOFT TISSUE
Status: DISCONTINUED | OUTPATIENT
Start: 2017-08-28 | End: 2017-08-28

## 2017-08-28 RX ORDER — IBUPROFEN 200 MG
16 TABLET ORAL
Status: DISCONTINUED | OUTPATIENT
Start: 2017-08-28 | End: 2017-08-29 | Stop reason: HOSPADM

## 2017-08-28 RX ORDER — ALBUMIN HUMAN 50 G/1000ML
25 SOLUTION INTRAVENOUS ONCE
Status: COMPLETED | OUTPATIENT
Start: 2017-08-28 | End: 2017-08-28

## 2017-08-28 RX ORDER — FENTANYL CITRATE 50 UG/ML
25 INJECTION, SOLUTION INTRAMUSCULAR; INTRAVENOUS EVERY 5 MIN PRN
Status: DISCONTINUED | OUTPATIENT
Start: 2017-08-28 | End: 2017-08-28 | Stop reason: HOSPADM

## 2017-08-28 RX ORDER — KETOROLAC TROMETHAMINE 30 MG/ML
30 INJECTION, SOLUTION INTRAMUSCULAR; INTRAVENOUS EVERY 8 HOURS
Status: DISCONTINUED | OUTPATIENT
Start: 2017-08-28 | End: 2017-08-29 | Stop reason: HOSPADM

## 2017-08-28 RX ORDER — MIDAZOLAM HYDROCHLORIDE 1 MG/ML
INJECTION INTRAMUSCULAR; INTRAVENOUS
Status: DISCONTINUED | OUTPATIENT
Start: 2017-08-28 | End: 2017-08-28

## 2017-08-28 RX ORDER — GLUCAGON 1 MG
1 KIT INJECTION
Status: DISCONTINUED | OUTPATIENT
Start: 2017-08-28 | End: 2017-08-29 | Stop reason: HOSPADM

## 2017-08-28 RX ORDER — OXYCODONE HYDROCHLORIDE 5 MG/1
5 TABLET ORAL EVERY 4 HOURS PRN
Status: DISCONTINUED | OUTPATIENT
Start: 2017-08-28 | End: 2017-08-29 | Stop reason: HOSPADM

## 2017-08-28 RX ORDER — PROPOFOL 10 MG/ML
VIAL (ML) INTRAVENOUS
Status: DISCONTINUED | OUTPATIENT
Start: 2017-08-28 | End: 2017-08-28

## 2017-08-28 RX ORDER — LIDOCAINE HCL/PF 100 MG/5ML
SYRINGE (ML) INTRAVENOUS
Status: DISCONTINUED | OUTPATIENT
Start: 2017-08-28 | End: 2017-08-28

## 2017-08-28 RX ORDER — CEFAZOLIN SODIUM 2 G/50ML
2 SOLUTION INTRAVENOUS ONCE
Status: COMPLETED | OUTPATIENT
Start: 2017-08-28 | End: 2017-08-28

## 2017-08-28 RX ORDER — CEFAZOLIN SODIUM 2 G/50ML
2 SOLUTION INTRAVENOUS
Status: COMPLETED | OUTPATIENT
Start: 2017-08-28 | End: 2017-08-29

## 2017-08-28 RX ORDER — SODIUM CHLORIDE 9 MG/ML
INJECTION, SOLUTION INTRAVENOUS CONTINUOUS
Status: DISCONTINUED | OUTPATIENT
Start: 2017-08-28 | End: 2017-08-29

## 2017-08-28 RX ORDER — ONDANSETRON 2 MG/ML
4 INJECTION INTRAMUSCULAR; INTRAVENOUS EVERY 8 HOURS PRN
Status: DISCONTINUED | OUTPATIENT
Start: 2017-08-28 | End: 2017-08-29 | Stop reason: HOSPADM

## 2017-08-28 RX ORDER — ALBUMIN HUMAN 50 G/1000ML
SOLUTION INTRAVENOUS
Status: COMPLETED
Start: 2017-08-28 | End: 2017-08-28

## 2017-08-28 RX ORDER — OXYCODONE HYDROCHLORIDE 5 MG/1
10 TABLET ORAL EVERY 4 HOURS PRN
Status: DISCONTINUED | OUTPATIENT
Start: 2017-08-28 | End: 2017-08-29 | Stop reason: HOSPADM

## 2017-08-28 RX ORDER — ONDANSETRON 2 MG/ML
4 INJECTION INTRAMUSCULAR; INTRAVENOUS DAILY PRN
Status: DISCONTINUED | OUTPATIENT
Start: 2017-08-28 | End: 2017-08-28 | Stop reason: HOSPADM

## 2017-08-28 RX ORDER — PRAVASTATIN SODIUM 40 MG/1
40 TABLET ORAL DAILY
Status: DISCONTINUED | OUTPATIENT
Start: 2017-08-29 | End: 2017-08-29 | Stop reason: HOSPADM

## 2017-08-28 RX ORDER — ROCURONIUM BROMIDE 10 MG/ML
INJECTION, SOLUTION INTRAVENOUS
Status: DISCONTINUED | OUTPATIENT
Start: 2017-08-28 | End: 2017-08-28

## 2017-08-28 RX ORDER — LIDOCAINE HYDROCHLORIDE 10 MG/ML
1 INJECTION, SOLUTION EPIDURAL; INFILTRATION; INTRACAUDAL; PERINEURAL ONCE
Status: COMPLETED | OUTPATIENT
Start: 2017-08-28 | End: 2017-08-28

## 2017-08-28 RX ORDER — SODIUM CHLORIDE 0.9 % (FLUSH) 0.9 %
3 SYRINGE (ML) INJECTION
Status: DISCONTINUED | OUTPATIENT
Start: 2017-08-28 | End: 2017-08-28

## 2017-08-28 RX ORDER — METOPROLOL SUCCINATE 100 MG/1
100 TABLET, EXTENDED RELEASE ORAL DAILY
Status: DISCONTINUED | OUTPATIENT
Start: 2017-08-29 | End: 2017-08-29 | Stop reason: HOSPADM

## 2017-08-28 RX ORDER — FAMOTIDINE 20 MG/1
20 TABLET, FILM COATED ORAL 2 TIMES DAILY
Status: DISCONTINUED | OUTPATIENT
Start: 2017-08-28 | End: 2017-08-29 | Stop reason: HOSPADM

## 2017-08-28 RX ORDER — IBUPROFEN 200 MG
24 TABLET ORAL
Status: DISCONTINUED | OUTPATIENT
Start: 2017-08-28 | End: 2017-08-29 | Stop reason: HOSPADM

## 2017-08-28 RX ORDER — KETAMINE HYDROCHLORIDE 100 MG/ML
INJECTION, SOLUTION INTRAMUSCULAR; INTRAVENOUS
Status: DISCONTINUED | OUTPATIENT
Start: 2017-08-28 | End: 2017-08-28

## 2017-08-28 RX ORDER — INSULIN ASPART 100 [IU]/ML
1-10 INJECTION, SOLUTION INTRAVENOUS; SUBCUTANEOUS
Status: DISCONTINUED | OUTPATIENT
Start: 2017-08-28 | End: 2017-08-29 | Stop reason: HOSPADM

## 2017-08-28 RX ORDER — ACETAMINOPHEN 10 MG/ML
1000 INJECTION, SOLUTION INTRAVENOUS EVERY 8 HOURS
Status: COMPLETED | OUTPATIENT
Start: 2017-08-28 | End: 2017-08-29

## 2017-08-28 RX ORDER — HYDROMORPHONE HYDROCHLORIDE 1 MG/ML
1 INJECTION, SOLUTION INTRAMUSCULAR; INTRAVENOUS; SUBCUTANEOUS
Status: DISCONTINUED | OUTPATIENT
Start: 2017-08-28 | End: 2017-08-29 | Stop reason: HOSPADM

## 2017-08-28 RX ORDER — BISACODYL 10 MG
10 SUPPOSITORY, RECTAL RECTAL 2 TIMES DAILY
Status: DISCONTINUED | OUTPATIENT
Start: 2017-08-28 | End: 2017-08-29 | Stop reason: HOSPADM

## 2017-08-28 RX ORDER — DIPHENHYDRAMINE HCL 25 MG
25 CAPSULE ORAL EVERY 6 HOURS PRN
Status: DISCONTINUED | OUTPATIENT
Start: 2017-08-28 | End: 2017-08-29 | Stop reason: HOSPADM

## 2017-08-28 RX ORDER — ONDANSETRON HYDROCHLORIDE 2 MG/ML
INJECTION, SOLUTION INTRAMUSCULAR; INTRAVENOUS
Status: DISCONTINUED | OUTPATIENT
Start: 2017-08-28 | End: 2017-08-28

## 2017-08-28 RX ORDER — SODIUM CHLORIDE 9 MG/ML
INJECTION, SOLUTION INTRAVENOUS CONTINUOUS
Status: DISCONTINUED | OUTPATIENT
Start: 2017-08-28 | End: 2017-08-28

## 2017-08-28 RX ORDER — SERTRALINE HYDROCHLORIDE 50 MG/1
50 TABLET, FILM COATED ORAL DAILY
Status: DISCONTINUED | OUTPATIENT
Start: 2017-08-29 | End: 2017-08-29 | Stop reason: HOSPADM

## 2017-08-28 RX ORDER — CLINDAMYCIN PHOSPHATE 900 MG/50ML
900 INJECTION, SOLUTION INTRAVENOUS
Status: DISCONTINUED | OUTPATIENT
Start: 2017-08-28 | End: 2017-08-28

## 2017-08-28 RX ORDER — HYDROMORPHONE HYDROCHLORIDE 1 MG/ML
0.2 INJECTION, SOLUTION INTRAMUSCULAR; INTRAVENOUS; SUBCUTANEOUS EVERY 5 MIN PRN
Status: DISCONTINUED | OUTPATIENT
Start: 2017-08-28 | End: 2017-08-28 | Stop reason: HOSPADM

## 2017-08-28 RX ORDER — KETOROLAC TROMETHAMINE 30 MG/ML
INJECTION, SOLUTION INTRAMUSCULAR; INTRAVENOUS
Status: DISCONTINUED | OUTPATIENT
Start: 2017-08-28 | End: 2017-08-28

## 2017-08-28 RX ORDER — NEOSTIGMINE METHYLSULFATE 1 MG/ML
INJECTION, SOLUTION INTRAVENOUS
Status: DISCONTINUED | OUTPATIENT
Start: 2017-08-28 | End: 2017-08-28

## 2017-08-28 RX ORDER — SODIUM CHLORIDE 0.9 % (FLUSH) 0.9 %
3 SYRINGE (ML) INJECTION
Status: DISCONTINUED | OUTPATIENT
Start: 2017-08-28 | End: 2017-08-28 | Stop reason: HOSPADM

## 2017-08-28 RX ORDER — ACETAMINOPHEN 10 MG/ML
INJECTION, SOLUTION INTRAVENOUS
Status: DISCONTINUED | OUTPATIENT
Start: 2017-08-28 | End: 2017-08-28

## 2017-08-28 RX ORDER — POLYETHYLENE GLYCOL 3350 17 G/17G
17 POWDER, FOR SOLUTION ORAL 2 TIMES DAILY
Status: DISCONTINUED | OUTPATIENT
Start: 2017-08-28 | End: 2017-08-29 | Stop reason: HOSPADM

## 2017-08-28 RX ORDER — GLYCOPYRROLATE 0.2 MG/ML
INJECTION INTRAMUSCULAR; INTRAVENOUS
Status: DISCONTINUED | OUTPATIENT
Start: 2017-08-28 | End: 2017-08-28

## 2017-08-28 RX ADMIN — HYDROMORPHONE HYDROCHLORIDE 0.2 MG: 1 INJECTION, SOLUTION INTRAMUSCULAR; INTRAVENOUS; SUBCUTANEOUS at 12:08

## 2017-08-28 RX ADMIN — ROCURONIUM BROMIDE 10 MG: 10 INJECTION, SOLUTION INTRAVENOUS at 10:08

## 2017-08-28 RX ADMIN — EPHEDRINE SULFATE 5 MG: 50 INJECTION, SOLUTION INTRAMUSCULAR; INTRAVENOUS; SUBCUTANEOUS at 10:08

## 2017-08-28 RX ADMIN — ALBUMIN HUMAN 25 G: 50 SOLUTION INTRAVENOUS at 01:08

## 2017-08-28 RX ADMIN — SODIUM CHLORIDE, SODIUM GLUCONATE, SODIUM ACETATE, POTASSIUM CHLORIDE, MAGNESIUM CHLORIDE, SODIUM PHOSPHATE, DIBASIC, AND POTASSIUM PHOSPHATE: .53; .5; .37; .037; .03; .012; .00082 INJECTION, SOLUTION INTRAVENOUS at 08:08

## 2017-08-28 RX ADMIN — DEXAMETHASONE SODIUM PHOSPHATE 8 MG: 4 INJECTION, SOLUTION INTRAMUSCULAR; INTRAVENOUS at 08:08

## 2017-08-28 RX ADMIN — HYDROMORPHONE HYDROCHLORIDE 0.2 MG: 1 INJECTION, SOLUTION INTRAMUSCULAR; INTRAVENOUS; SUBCUTANEOUS at 01:08

## 2017-08-28 RX ADMIN — ACETAMINOPHEN 1000 MG: 10 INJECTION, SOLUTION INTRAVENOUS at 08:08

## 2017-08-28 RX ADMIN — INSULIN ASPART 4 UNITS: 100 INJECTION, SOLUTION INTRAVENOUS; SUBCUTANEOUS at 05:08

## 2017-08-28 RX ADMIN — DEXMEDETOMIDINE HYDROCHLORIDE 0.5 MCG/KG/HR: 100 INJECTION, SOLUTION, CONCENTRATE INTRAVENOUS at 08:08

## 2017-08-28 RX ADMIN — LIDOCAINE HYDROCHLORIDE 60 MG: 20 INJECTION, SOLUTION INTRAVENOUS at 07:08

## 2017-08-28 RX ADMIN — PROPOFOL 170 MG: 10 INJECTION, EMULSION INTRAVENOUS at 07:08

## 2017-08-28 RX ADMIN — POLYETHYLENE GLYCOL 3350 17 G: 17 POWDER, FOR SOLUTION ORAL at 09:08

## 2017-08-28 RX ADMIN — CEFAZOLIN SODIUM 2 G: 2 SOLUTION INTRAVENOUS at 08:08

## 2017-08-28 RX ADMIN — ACETAMINOPHEN 1000 MG: 10 INJECTION, SOLUTION INTRAVENOUS at 11:08

## 2017-08-28 RX ADMIN — OXYCODONE HYDROCHLORIDE 10 MG: 5 TABLET ORAL at 12:08

## 2017-08-28 RX ADMIN — SODIUM CHLORIDE: 0.9 INJECTION, SOLUTION INTRAVENOUS at 06:08

## 2017-08-28 RX ADMIN — ROCURONIUM BROMIDE 20 MG: 10 INJECTION, SOLUTION INTRAVENOUS at 09:08

## 2017-08-28 RX ADMIN — KETOROLAC TROMETHAMINE 30 MG: 30 INJECTION, SOLUTION INTRAMUSCULAR at 04:08

## 2017-08-28 RX ADMIN — LIDOCAINE HYDROCHLORIDE 0.2 MG: 10 INJECTION, SOLUTION EPIDURAL; INFILTRATION; INTRACAUDAL; PERINEURAL at 06:08

## 2017-08-28 RX ADMIN — ACETAMINOPHEN 1000 MG: 10 INJECTION, SOLUTION INTRAVENOUS at 04:08

## 2017-08-28 RX ADMIN — OXYCODONE HYDROCHLORIDE 10 MG: 5 TABLET ORAL at 04:08

## 2017-08-28 RX ADMIN — EPHEDRINE SULFATE 5 MG: 50 INJECTION, SOLUTION INTRAMUSCULAR; INTRAVENOUS; SUBCUTANEOUS at 09:08

## 2017-08-28 RX ADMIN — EPHEDRINE SULFATE 5 MG: 50 INJECTION, SOLUTION INTRAMUSCULAR; INTRAVENOUS; SUBCUTANEOUS at 11:08

## 2017-08-28 RX ADMIN — KETOROLAC TROMETHAMINE 30 MG: 30 INJECTION, SOLUTION INTRAMUSCULAR at 09:08

## 2017-08-28 RX ADMIN — SODIUM CHLORIDE 1000 ML: 0.9 INJECTION, SOLUTION INTRAVENOUS at 12:08

## 2017-08-28 RX ADMIN — ALBUMIN (HUMAN) 25 G: 12.5 SOLUTION INTRAVENOUS at 01:08

## 2017-08-28 RX ADMIN — OXYCODONE HYDROCHLORIDE 10 MG: 5 TABLET ORAL at 09:08

## 2017-08-28 RX ADMIN — GLYCOPYRROLATE 0.6 MG: 0.2 INJECTION, SOLUTION INTRAMUSCULAR; INTRAVENOUS at 11:08

## 2017-08-28 RX ADMIN — KETOROLAC TROMETHAMINE 30 MG: 30 INJECTION, SOLUTION INTRAMUSCULAR; INTRAVENOUS at 08:08

## 2017-08-28 RX ADMIN — INSULIN ASPART 3 UNITS: 100 INJECTION, SOLUTION INTRAVENOUS; SUBCUTANEOUS at 09:08

## 2017-08-28 RX ADMIN — ROCURONIUM BROMIDE 20 MG: 10 INJECTION, SOLUTION INTRAVENOUS at 08:08

## 2017-08-28 RX ADMIN — KETAMINE HYDROCHLORIDE 30 MG: 100 INJECTION, SOLUTION, CONCENTRATE INTRAMUSCULAR; INTRAVENOUS at 08:08

## 2017-08-28 RX ADMIN — CEFAZOLIN SODIUM 2 G: 2 SOLUTION INTRAVENOUS at 04:08

## 2017-08-28 RX ADMIN — MIDAZOLAM HYDROCHLORIDE 2 MG: 1 INJECTION, SOLUTION INTRAMUSCULAR; INTRAVENOUS at 06:08

## 2017-08-28 RX ADMIN — SODIUM CHLORIDE, SODIUM GLUCONATE, SODIUM ACETATE, POTASSIUM CHLORIDE, MAGNESIUM CHLORIDE, SODIUM PHOSPHATE, DIBASIC, AND POTASSIUM PHOSPHATE: .53; .5; .37; .037; .03; .012; .00082 INJECTION, SOLUTION INTRAVENOUS at 09:08

## 2017-08-28 RX ADMIN — NEOSTIGMINE METHYLSULFATE 5 MG: 1 INJECTION INTRAVENOUS at 11:08

## 2017-08-28 RX ADMIN — ROCURONIUM BROMIDE 50 MG: 10 INJECTION, SOLUTION INTRAVENOUS at 07:08

## 2017-08-28 RX ADMIN — KETAMINE HYDROCHLORIDE 10 MG: 100 INJECTION, SOLUTION, CONCENTRATE INTRAMUSCULAR; INTRAVENOUS at 10:08

## 2017-08-28 RX ADMIN — ONDANSETRON 4 MG: 2 INJECTION, SOLUTION INTRAMUSCULAR; INTRAVENOUS at 11:08

## 2017-08-28 RX ADMIN — FAMOTIDINE 20 MG: 20 TABLET, FILM COATED ORAL at 09:08

## 2017-08-28 RX ADMIN — CEFAZOLIN SODIUM 2 G: 2 SOLUTION INTRAVENOUS at 11:08

## 2017-08-28 RX ADMIN — SODIUM CHLORIDE: 0.9 INJECTION, SOLUTION INTRAVENOUS at 12:08

## 2017-08-28 NOTE — NURSING
Pt admitted to floor, stable, VSS, no complaints at this time noted or stated, I.S at bedside, SCDs intact, will hand over to nurse. BITE pain menu, TV guide, pain control pamphlet, given, explained, and offered to patient. Macey Henry RN

## 2017-08-28 NOTE — NURSING TRANSFER
Nursing Transfer Note      8/28/2017     Transfer To: 544A    Transfer via stretcher    Transfer with IV pump    Transported by pct    Medicines sent: yes    Chart send with patient: Yes    Notified: spouse     [id number]}

## 2017-08-28 NOTE — OP NOTE
8/28/2017      Procedure:   1) laparoscopic radical prostatectomy with robotic assistance  2) laparoscopic bilateral pelvic lymph node dissection    Preop diagnosis: Prostate Cancer  Postop diagnosis: Prostate Cancer, Stage T1C    Surgeon: Alexey Kim MD (present for the entire procedure)  Assistant: Abdirizak MAYES  EBL: 150 ccs    Wound Class: 2    Specimens removed: prostate, seminal vesicles, lymph nodes, bladder neck biopsy    Drain: Zelaya      PROCEDURE NOTE:  Preoperatively the H&P were updated. Also confirmed that patient had had their hibiclens shower and preop hydration. After general endotracheal anesthesia, the patient was carefully positioned, padded, prepped and draped.  Positioning and padding was checked by the surgeon and the circulating nurse.  IV antibiotics were administered within 1 hour prior to making initial skin incision.  An OG tube was placed.  A Zelaya catheter was placed.  A timeout was called. The patient's identity was confirmed with 2 identifiers.  The correct procedure, allergies, blood products were verified by the entire operative team.                                                                      A Veress needle was placed at the supraumbilical position and the abdomen insufflated to 15 mmHg.  A 12 mm trocar was placed at this site and a 0 degree camera was introduced. The abdominal cavity was carefully inspected. There was no evidence of trauma to the peritoneal contents, nor any evidence of injury to the retroperitoneum.  All accessory trocars were then placed under direct vision.                                                                             The peritoneum posterior to the bladder was incised, the right vas deferens                     identified and divided.  The right seminal vesicle was gently dissected away from surrounding tissue with care being taken not to cause stretch  or thermal injury to the lateral pedicle. A similar procedure was performed  on   the left side.  Both seminal vesicles were retracted anteriorly. Denonvilliers fascia was incised and this plane of dissection carried down to the level of the apex of the prostate.  A posterior/lateral release was performed bilaterally.                                                                                                                                            An anterior bladder drop was performed.  After dropping the bladder, a right sided pelvic lymph node dissection was completed and this was sent as permanent pathologic specimen #1.  The endopelvic fascia on the right  was gently dissected posteriorly, thus beginning the lateral release.  A left sided pelvic lymph node dissection was performed and this was sent  as permanent pathologic specimen #2.  Again, the endopelvic fascia was   gently dissected posteriorly, completing the lateral release.  A V stitch was used in a figure of eight fashion to control the dorsal venous complex. Excellent hemostasis was noted at this juncture. The plane between the bladder neck and prostate base was developed and the urethra was divided, a bladder neck sparing approach was utilized.  Excellent preservation of the internal sphincter was achieved circumferentially.     The bladder neck biopsy was sent as permanent section specimen #3.      After dividing the posterior bladder neck, the seminal vesicles and vas ampulla were revisualized and retracted anteriorly. The lateral pedicle on the right was controlled with bipolar cautery.      Cold scissors were used to athermically dissect the neurovascular bundle away  from the capsule of the prostate down to the level of the urethra, thus preserving the right neurovascular bundle.      A similar procedure was performed on the left side.    The urethra at the apex was divided preserving maximal urethral length.The rectourethralis was divided. The specimen was placed in the right colic gutter. The pelvis was  irrigated  and carefully inspected.  There was no evidence of rectal trauma and there was excellent hemostasis.  A vesicourethral anastomosis was then performed with a running suture of 3-0 Monocryl.  After completing the anastomosis, a fresh Zelaya catheter was advanced into the bladder and the balloon  inflated.  The bladder was irrigated, there was noted to be no extravasation at the anastomosis. The specimen was placed in an endocatch bag.     Throughout the procedure the first assistant assisted with positioning, trocar placement, docking. She also provided assistance with exposure, visualization, traction/countertraction, suction and irrigation.     A drain .was not placed, specimen was removed from the field and port sites were closed.  Needle and sponge counts were correctThe patient was transferred to the Recovery Room in stable condition.

## 2017-08-28 NOTE — TRANSFER OF CARE
"Anesthesia Transfer of Care Note    Patient: Bert Knott    Procedure(s) Performed: Procedure(s) (LRB):  ROBOTIC ASSISTED LAPAROSCOPIC PROSTATECTOMY (N/A)    Patient location: PACU    Anesthesia Type: general    Transport from OR: Transported from OR on 6-10 L/min O2 by face mask with adequate spontaneous ventilation    Post pain: adequate analgesia    Post assessment: no apparent anesthetic complications    Post vital signs: stable    Level of consciousness: awake, alert and oriented    Nausea/Vomiting: no nausea/vomiting    Complications: none    Transfer of care protocol was followed      Last vitals:   Visit Vitals  BP (!) 95/51 (BP Location: Right arm, Patient Position: Lying)   Pulse (!) 38   Temp 37.1 °C (98.8 °F) (Oral)   Resp 14   Ht 5' 11" (1.803 m)   Wt 98 kg (216 lb)   SpO2 100%   BMI 30.13 kg/m²     "

## 2017-08-28 NOTE — ANESTHESIA PROCEDURE NOTES
Arterial    Diagnosis: prostate lesion     Patient location during procedure: done in OR  Procedure start time: 8/28/2017 8:16 AM  Timeout: 8/28/2017 8:16 AM  Procedure end time: 8/28/2017 8:18 AM  Staffing  Anesthesiologist: SHANTE CHRISTIAN  Resident/CRNA: MIRNA MAJANO  Anesthesiologist was present at the time of the procedure.  Preanesthetic Checklist  Completed: patient identified, site marked, surgical consent, pre-op evaluation, timeout performed, IV checked, risks and benefits discussed, monitors and equipment checked and anesthesia consent givenArterial  Skin Prep: chlorhexidine gluconate  Local Infiltration: none  Orientation: left  Location: radial  Catheter Size: 20 G  Catheter placement by Anatomical landmarks. Heme positive aspiration all ports.Insertion Attempts: 1  Assessment  Dressing: secured with tape and tegaderm  Patient: Tolerated well

## 2017-08-28 NOTE — H&P (VIEW-ONLY)
Urology ([facility]) H&P for upcoming procedure  Staff:  Dr. Nickolas Kim MD    Is this patient in a research study?  No    CC: prostate adencarcinoma    HPI:  Bert Knott is a 62 y.o. male with history of DM, HTN who was found to have [id number] [place] 3+4=7 prostate adenocarcinoma. He is continent and does have some urinary symptoms, most notably frequency q2h during the day and nocturia 3x/night. He says this is much improved from previous now that he is on flomax. He does have mild ED and low libido. He says he is taking cialis which helps him. He does not have any bone pain, chest pain or shortness of breath.     The patient initially presented with elevated PSA.  TRUS biopsy revealed the following:        LEFT   RIGHT  BASE   benign   0/2, high grade intraepithelial neoplasia  MID   benign   2/2, (3+4), 100%  APEX   benign   2/2, (3+4), 65%    Date of Biopsy: 7/20/2017  PSA: 6.7  Volume: 23 g  Voiding complaints: present  ED: present  Incontinence: absent    ROS:  Neg except per HPI, specifically no bone pain, no unintentional weight loss, no anorexia, no night sweats    Past Medical History:   Diagnosis Date    Allergy     COPD (chronic obstructive pulmonary disease)     Diabetes mellitus     Diabetes mellitus due to abnormal insulin 7/25/2017    Elevated PSA     Hypertension        Past Surgical History:   Procedure Laterality Date    BACK SURGERY  1974    lamputation of little finger tip  1979    MOUTH SURGERY  1972    tonsilectomy  1960       Social History     Social History    Marital status:      Spouse name: N/A    Number of children: N/A    Years of education: N/A     Occupational History          Social History Main Topics    Smoking status: Former Smoker     Packs/day: 1.50     Types: Cigarettes, Vaping with nicotine     Start date: 1972     Quit date: 2014    Smokeless tobacco: Not on file    Alcohol use No    Drug use: No    Sexual activity: Yes      Partners: Female     Other Topics Concern    Not on file     Social History Narrative    No narrative on file       Family History   Problem Relation Age of Onset    Hypertension Father     Cancer Mother     Cancer Paternal Grandmother     Prostate cancer Paternal Grandfather     Cancer Maternal Grandmother        Review of patient's allergies indicates:   Allergen Reactions    Penicillins Rash    Wellbutrin [bupropion hcl] Rash       Current Outpatient Prescriptions on File Prior to Visit   Medication Sig Dispense Refill    FREESTYLE LITE STRIPS Strp TEST BLOOD SUGAR D  3    JANUMET  mg per tablet TK 1 T PO BID  1    methotrexate 2.5 MG Tab TK 10 TS PO WEEKLY  0    metoprolol succinate (TOPROL-XL) 100 MG 24 hr tablet TK 1 T PO D  1    pravastatin (PRAVACHOL) 40 MG tablet TK 1 T PO D  1    sertraline (ZOLOFT) 50 MG tablet TK 1 T PO D  1    tamsulosin (FLOMAX) 0.4 mg Cp24 TK 1 C PO D  3     No current facility-administered medications on file prior to visit.        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    Lab Results   Component Value Date    WBC 6.15 08/22/2017    HGB 14.1 08/22/2017    HCT 40.2 08/22/2017    MCV 92 08/22/2017     08/22/2017       BMP  Lab Results   Component Value Date    CREATININE 0.9 07/25/2017    ESTGFRAFRICA >60.0 07/25/2017    EGFRNONAA >60.0 07/25/2017       No results found for: PSA, PSADIAG, PSATOTAL, PSAFREE, PSAFREEPCT    Imaging:  Prostate MRI 8/3/17: T2 hypointense lesion on right mid peripheral prostate gland,  PIRADS 5 lesion, no extracapsular extension, no lymphadenopathy    Assessment: Bert Knott is a 62 y.o. male with [id number] [place] 3+4=7 prostate adenocarcinoma.      Plan:   1. To OR on 8/28/2017 for RALP without BPLND  2. Consents signed   3. I have explained the risk, benefits, and alternatives of the procedure in detail. The patient voices understanding and all questions have been answered. The patient agrees to proceed as planned.       Alexandro Ayala MD

## 2017-08-28 NOTE — PROGRESS NOTES
Patient awake and alert. Voice no complaints. No s/s of distress noted. Wife at bedside. All questions addressed. Verbalized understanding.   Teds/SCDs placed on patient

## 2017-08-28 NOTE — OR NURSING
Rotbot time out completed with pre incision time out.  All DaVinci instruments inspected before case by Justyna Huynh .  All instruments appear to be intact.

## 2017-08-29 VITALS
RESPIRATION RATE: 18 BRPM | SYSTOLIC BLOOD PRESSURE: 131 MMHG | BODY MASS INDEX: 30.24 KG/M2 | WEIGHT: 216 LBS | HEIGHT: 71 IN | OXYGEN SATURATION: 99 % | TEMPERATURE: 97 F | DIASTOLIC BLOOD PRESSURE: 75 MMHG | HEART RATE: 52 BPM

## 2017-08-29 LAB
POCT GLUCOSE: 124 MG/DL (ref 70–110)
POCT GLUCOSE: 287 MG/DL (ref 70–110)

## 2017-08-29 PROCEDURE — 63600175 PHARM REV CODE 636 W HCPCS: Performed by: UROLOGY

## 2017-08-29 PROCEDURE — 25000003 PHARM REV CODE 250: Performed by: UROLOGY

## 2017-08-29 RX ORDER — SULFAMETHOXAZOLE AND TRIMETHOPRIM 800; 160 MG/1; MG/1
1 TABLET ORAL 2 TIMES DAILY
Qty: 6 TABLET | Refills: 0 | Status: SHIPPED | OUTPATIENT
Start: 2017-08-29 | End: 2017-09-01

## 2017-08-29 RX ORDER — OXYCODONE AND ACETAMINOPHEN 5; 325 MG/1; MG/1
1 TABLET ORAL EVERY 4 HOURS PRN
Qty: 41 TABLET | Refills: 0 | Status: SHIPPED | OUTPATIENT
Start: 2017-08-29 | End: 2017-10-05

## 2017-08-29 RX ORDER — POLYETHYLENE GLYCOL 3350 17 G/17G
17 POWDER, FOR SOLUTION ORAL DAILY
Qty: 30 PACKET | Refills: 0 | Status: SHIPPED | OUTPATIENT
Start: 2017-08-29 | End: 2017-10-05

## 2017-08-29 RX ADMIN — SERTRALINE HYDROCHLORIDE 50 MG: 50 TABLET ORAL at 09:08

## 2017-08-29 RX ADMIN — PRAVASTATIN SODIUM 40 MG: 40 TABLET ORAL at 09:08

## 2017-08-29 RX ADMIN — POLYETHYLENE GLYCOL 3350 17 G: 17 POWDER, FOR SOLUTION ORAL at 09:08

## 2017-08-29 RX ADMIN — KETOROLAC TROMETHAMINE 30 MG: 30 INJECTION, SOLUTION INTRAMUSCULAR at 06:08

## 2017-08-29 RX ADMIN — METOPROLOL SUCCINATE 100 MG: 100 TABLET, EXTENDED RELEASE ORAL at 09:08

## 2017-08-29 RX ADMIN — OXYCODONE HYDROCHLORIDE 10 MG: 5 TABLET ORAL at 06:08

## 2017-08-29 RX ADMIN — ACETAMINOPHEN 1000 MG: 10 INJECTION, SOLUTION INTRAVENOUS at 07:08

## 2017-08-29 RX ADMIN — DIPHENHYDRAMINE HYDROCHLORIDE 25 MG: 25 CAPSULE ORAL at 12:08

## 2017-08-29 RX ADMIN — CEFAZOLIN SODIUM 2 G: 2 SOLUTION INTRAVENOUS at 08:08

## 2017-08-29 RX ADMIN — FAMOTIDINE 20 MG: 20 TABLET, FILM COATED ORAL at 09:08

## 2017-08-29 NOTE — PROGRESS NOTES
[facility]  Urology  Progress Note    Patient Name: Bert Knott  MRN: [id number]  Admission Date: 8/28/2017  Hospital Length of Stay: 1 days  Code Status: No Order   Attending Provider: Nickolas Kim MD   Primary Care Physician: Mike Larson MD    Subjective:     HPI:  Bert Knott is a 62 y.o. male POD 1 s/p RALP    Interval History:   No acute events overnight  Pain well controlled  Ambulating well  No nausea, tolerating diet    Review of Systems  Objective:     Temp:  [97.2 °F (36.2 °C)-98.4 °F (36.9 °C)] 98.4 °F (36.9 °C)  Pulse:  [38-61] 61  Resp:  [11-18] 18  SpO2:  [96 %-100 %] 96 %  BP: ()/(43-82) 127/68  Arterial Line BP: ()/(42-62) 113/53     Body mass index is 30.13 kg/m².            Drains     Drain                 Urethral Catheter 08/28/17 0834 Latex 18 Fr. less than 1 day                Physical Exam   Constitutional: No distress.   HENT:   Head: Normocephalic and atraumatic.   Eyes: Conjunctivae are normal. No scleral icterus.   Neck: Normal range of motion.   Cardiovascular: Normal rate.    Pulmonary/Chest: Effort normal. No respiratory distress.   Abdominal: Soft. He exhibits no distension. There is tenderness (appropriate). There is no rebound and no guarding.   Incisions c/d/i  16 Fr parada draining clear yellow   Musculoskeletal: Normal range of motion.   SCDs in place   Neurological: He is alert.   Skin: Skin is warm and dry. He is not diaphoretic.     Psychiatric: He has a normal mood and affect.       Significant Labs:    BMP:    Recent Labs  Lab 08/22/17  1405 08/28/17 2019    139   K 4.5 4.7    109   CO2 31* 22*   BUN 14 13   CREATININE 1.0 1.1   CALCIUM 9.8 8.2*       CBC:     Recent Labs  Lab 08/22/17  1405 08/28/17  0908 08/28/17 2019   WBC 6.15  --  10.24   HGB 14.1  --  12.1*   HCT 40.2 33* 34.4*     --  216       All pertinent labs results from the past 24 hours have been reviewed.    Significant Imaging:  All  pertinent imaging results/findings from the past 24 hours have been reviewed.      Assessment/Plan:     * Prostate cancer    - IV tylenol, PO oxycodone for pain control  - regular diet  - dc MIVF  - Ambulate pm of surgery and qid  - Drains: Home with parada, leg bag,  bag, and teaching  - Prophylaxis: IS, SCDs, GI ppx    DC home this AM            VTE Risk Mitigation         Ordered     Medium Risk of VTE  Once      08/28/17 1218     Place sequential compression device  Until discontinued      08/28/17 1216          Mariana Conrad MD  Urology  [facility]

## 2017-08-29 NOTE — NURSING
Pt discharged in stable condition, discharge instructions and prescriptions given, pt verbalized understanding. Surgical site intact.pt waiting on transport. Macey Henry RN

## 2017-08-29 NOTE — ASSESSMENT & PLAN NOTE
- IV tylenol, PO oxycodone for pain control  - regular diet  - dc MIVF  - Ambulate pm of surgery and qid  - Drains: Home with parada, leg bag,  bag, and teaching  - Prophylaxis: IS, SCDs, GI ppx    DC home this AM

## 2017-08-29 NOTE — ANESTHESIA POSTPROCEDURE EVALUATION
"Anesthesia Post Evaluation    Patient: Bert Knott    Procedure(s) Performed: Procedure(s) (LRB):  ROBOTIC ASSISTED LAPAROSCOPIC PROSTATECTOMY (N/A)    Final Anesthesia Type: general  Patient location during evaluation: PACU  Patient participation: Yes- Able to Participate  Level of consciousness: awake and alert  Post-procedure vital signs: reviewed and stable  Pain management: adequate  Airway patency: patent  PONV status at discharge: No PONV  Anesthetic complications: no      Cardiovascular status: blood pressure returned to baseline  Respiratory status: unassisted  Hydration status: euvolemic  Follow-up not needed.        Visit Vitals  /75 (BP Location: Right arm, Patient Position: Lying)   Pulse (!) 52   Temp 35.9 °C (96.6 °F) (Oral)   Resp 18   Ht 5' 11" (1.803 m)   Wt 98 kg (216 lb)   SpO2 99%   BMI 30.13 kg/m²       Pain/Ryan Score: Pain Assessment Performed: Yes (8/28/2017  9:00 PM)  Presence of Pain: complains of pain/discomfort (8/28/2017  9:00 PM)  Pain Rating Prior to Med Admin: 3 (8/29/2017  7:58 AM)  Pain Rating Post Med Admin: 2 (8/28/2017  4:44 PM)  Ryan Score: 10 (8/28/2017  4:00 PM)      "

## 2017-08-29 NOTE — PROGRESS NOTES
Discharge Note:Pt discharged home alert and oriented x4, skin warm to touch, x6 lap sites with derma fine. Pt and wife verbalized understanding of discharged teaching.

## 2017-08-29 NOTE — SUBJECTIVE & OBJECTIVE
Interval History:   No acute events overnight  Pain well controlled  Ambulating well  No nausea, tolerating diet    Review of Systems  Objective:     Temp:  [97.2 °F (36.2 °C)-98.4 °F (36.9 °C)] 98.4 °F (36.9 °C)  Pulse:  [38-61] 61  Resp:  [11-18] 18  SpO2:  [96 %-100 %] 96 %  BP: ()/(43-82) 127/68  Arterial Line BP: ()/(42-62) 113/53     Body mass index is 30.13 kg/m².            Drains     Drain                 Urethral Catheter 08/28/17 0834 Latex 18 Fr. less than 1 day                Physical Exam   Constitutional: No distress.   HENT:   Head: Normocephalic and atraumatic.   Eyes: Conjunctivae are normal. No scleral icterus.   Neck: Normal range of motion.   Cardiovascular: Normal rate.    Pulmonary/Chest: Effort normal. No respiratory distress.   Abdominal: Soft. He exhibits no distension. There is tenderness (appropriate). There is no rebound and no guarding.   Incisions c/d/i  16 Fr parada draining clear yellow   Musculoskeletal: Normal range of motion.   SCDs in place   Neurological: He is alert.   Skin: Skin is warm and dry. He is not diaphoretic.     Psychiatric: He has a normal mood and affect.       Significant Labs:    BMP:    Recent Labs  Lab 08/22/17  1405 08/28/17 2019    139   K 4.5 4.7    109   CO2 31* 22*   BUN 14 13   CREATININE 1.0 1.1   CALCIUM 9.8 8.2*       CBC:     Recent Labs  Lab 08/22/17  1405 08/28/17  0908 08/28/17 2019   WBC 6.15  --  10.24   HGB 14.1  --  12.1*   HCT 40.2 33* 34.4*     --  216       All pertinent labs results from the past 24 hours have been reviewed.    Significant Imaging:  All pertinent imaging results/findings from the past 24 hours have been reviewed.

## 2017-08-29 NOTE — DISCHARGE INSTRUCTIONS
What to expect with your Robotic Assisted Laparoscopic Prostatectomy.  [facility]  Updated 06/10/2011   Surgery  o Your surgery will last between 1.5 - 3 hours.   After surgery  o You may or may not have a drain that is shaped like a grenade and put to suction  - This drain usually comes out on Post Op Day (POD) 1. It will remain if the output is high and the nurses will teach you how to record the output and you will come back a few days after you leave to have the drain removed  o You will have a catheter after your surgery.  - This catheter protects your tissues to allow for healing between the bladder neck and the urethra now that the prostate has been removed.  - NO ONE IS TO REMOVE THIS CATHETER OR CHANGE THE CATHETER OTHER THAN SOMEONE FROM [facility].  - If you have to go to the ER because your catheter is not draining, come to the [facility] if possible and they will call us if they are not able to irrigate your catheter.  - If you live out of town and have to go to a local ER, then DO NOT let that doctor remove your catheter. If they are unable to irrigate the catheter or are having troubles with it, have them page the [facility] resident on call immediately at [phone] to help answer any questions.  - The catheter should come out in 7-10 days.   - You will have a midline incision after surgery where the prostate was removed. This will be sewn with absorbable suture so you do not need to worry about having the sutures removed.  - You will have smaller incisions where the instruments were inserted that are also sewn closed with absorbable sutures.  o The night of surgery we expect and hope that you will:  - Walk - walking helps get the bowels moving. Also after your surgery, you are at a risk for a deep venous thrombosis (which is a clot in the legs that can form by remaining inactive or still for extended periods of time) and this can travel to your lungs and make you  feel short of breath. This is a very serious condition. Walking helps prevent a DVT from occurring.  - Eat - you do not have to eat a whole meal, but we want to make sure you can tolerate liquid and/or solid food without nausea and vomiting  - Use your incentive spirometer - this is the breathing apparatus that helps you expand your lungs. If and when you have pain you will not want to take deep breaths. But if you dont take deep breaths, you are at risk for pneumonia. The incentive spirometer will help prevent this from occurring by expanding your lungs.  o Symptoms you may experience immediately post-op:  - Bloating and/or shoulder pain - when we do this operation, we fill up your abdominal cavity with gas to better help us visualize the organs and allow our instruments to fit. After the surgery, not all the air can be removed and your body will eventually absorb this small amount of air. However this can make you feel bloated. In addition, when you sit up, the air can sit right under a muscle (the diaphragm) which has connecting nerves to the shoulders, which could explain why you have shoulder pain.  - Do not expect necessarily to have gas or to have a bowel movement - this goes along with the bloating, you may feel like you want to pass gas or have a bowel movement but you cant. This is normal and you will feel like this for a couple days. There are no pills to help with this. Small walks throughout the day should help with this.  - Pain - your pain should be able to be controlled with medicines by mouth that we prescribe. It is important for you to tell us if you are on any pain medications at home before the surgery as you may need stronger pain meds while in the hospital.  - Bladder spasms - this feels like you have to urinate but cant. Sometimes it can be due to clots clogging up your catheter. The nurse will irrigate the catheter, if there are no clots we can prescribe you an anti-spasmodic. We can also  send you home with a prescription for one.   If you go home on anti-spasmodics, do not take one the morning of your appointment to have your catheter removed or you may not be able to void.   You can go home when:  o Pain is controlled with medicines by mouth  o You are able to walk without difficulty or pain  o You are tolerating a regulating diet  o Your catheter is draining freely   When you go home:  o Catheter care  - Blood in your urine (hematuria) is normal. However if you are having clots or your catheter stops draining and you are experiencing abdominal pain, go to the ER.  - If the tip of your penis hurts with the catheter in place, you can put some Vaseline at the end of the catheter to help lubricate the catheter.  o Activity  - Continue to walk - small walks throughout the day are better than one long walk.   - Do not lift anything greater than 8 pounds for 6 weeks - we want your abdominal wall muscles to heal.  o Bowel Movements - Do not strain to have a bowel movement - the pain medicines will make you constipated. That is why we also ask you to take colace 2-3 x per day to help keep your bowels regular. If you are still having trouble, then you can also add Miralax once a day. Do not take any stool softeners if you are having diarrhea.  o Drain - If you have a drain (not your catheter, but a separate drain) then record the output and bring it with you to your next appointment  o Smoking - If you smoke, we encourage you to STOP. Smoking interferes with the healing process and will prolong your healing with continued smoking.  o Driving - Do not drive while you are on pain meds or with your catheter in place.  o Bathing - If you do not have a drain, you can shower 48 hours after your surgery. If you do have a drain, sponge bathe only until the drain is out.  o Dressing - you can remove the dressings if there is no drainage or change them as needed if there is. The little sterile band-aid strips will  fall off on their own in 10-14 days. If they have not fallen off then you can remove them yourself after 14 days.  o Kegels - do not start your Kegels until after your catheter is out.  o Restarting medicines -especially blood thinners (Coumadin, ASA,Plavix), Fish Oil. Discuss this with your physician prior to discharge   When to return to the ER  o Fever - If you have a fever >101.5, this could be due to a number of reasons such as infection of the urine or incision. If your catheter has been removed, you could possibly have a leak. It would be best to come to the ER so they can better evaluate you.  o Severe pain - pain is expected, but severe or new onset of pain is not normal.   o Inability to tolerate food or liquid with nausea and vomiting - it would be best to go to the ER for them to better evaluate you.      Consents to be obtained - Surgical and Blood.  Before you sign the consent, understand the following risks:  o Surgery consent  - Bleeding - the prostate and its surrounding vessels are very vascular and bleeding can occur requiring a blood transfusion.  - Erectile dysfunction (impotence) - you will not have erections after surgery immediately post-op.  Your potency depends on your age and if you were potent beforehand. Nerve-sparing also helps. However, it does not guarantee erectile function. There are options to discuss after surgery if you still remain impotent.  - Incontinence -95% of people regain their continence by one year. How fast you become continent again cannot be determined beforehand. Kegels, however, do help and you should start your kegels after your catheter is out.   - Blockage of ureters (the drainage tube between the kidney and the bladder)- if this occurrs, you will need another procedure to unblock the ureters.  - Stricture of the urethra or Bladder Neck Contracture - any time the urethra is instrumented, there is a risk of having strictures and this would require another  procedure to dilate the stricture. Bladder neck contracture can occur after any type of anastomotic procedure. You would present with difficulty voiding or emptying your bladder.  - Damage to rectal wall, bowel, liver, spleen or any of the surrounding organs   - Infection of urine or incision requiring further treatment  - Pulmonary Emboli (blood clots) - this is why we ask you to walk around after surgery and the night of surgery to help prevent clots  - Need for conversion to open procedure   - Air embolus - to introduce air into the abdomen a needle is inserted into the abdomen, if this enters a vein or artery it can cause an embolus that travels to the heart and cause death.  - Anastomotic leak - there is always a chance that the tissues have not healed when the catheter is removed and you may possibly have a leak. You may possibly present or complain of abdominal pain, weakness or fever. If you have these symptoms return to the ER. If you have a leak you will need a catheter to be replaced, likely under direct vision with a camera.   - Small bowel obstruction (SBO) - after surgery, people can have adhesions that form from inflammation caused by surgery and this can cause a SBO, which would present with nausea and vomiting and unable to pass stool.  - Ileus - your bowels can be in shock after surgery and you will present with nausea and vomiting. Sometimes this requires a tube that is placed into your stomach until your bowels start working again, this is temporary.  - Acute or chronic pain  - Re-insertion of catheter if there is a urinary leak  - Scars  - Death, paralysis from the neck or waist down, loss of limb  o Blood consents  - Risk of HIV and Hepatitis or any other blood borne disease  - Risk of allergic reaction to blood treated symptomatically  -

## 2017-08-29 NOTE — DISCHARGE SUMMARY
[facility]  Urology  Discharge Summary      Patient Name: Bert Knott  MRN: [id number]  Admission Date: 8/28/2017  Hospital Length of Stay: 1 days  Discharge Date and Time:  08/29/2017 12:47 PM  Attending Physician: Nickolas Kim MD  Discharging Provider: Mariana Conrad MD  Primary Care Physician: Mike Larson MD    HPI: Bert Knott is a 62 y.o. male with prostate cancer.     Procedure(s) (LRB):  ROBOTIC ASSISTED LAPAROSCOPIC PROSTATECTOMY (N/A)     Indwelling Lines/Drains at time of discharge:   Lines/Drains/Airways     Drain                 Urethral Catheter 08/28/17 0834 Latex 18 Fr. 1 day                Hospital Course (synopsis of major diagnoses, care, treatment, and services provided during the course of the hospital stay): Patient was admitted following RALP. He tolerated the procedure well with no complications. On POD 1 his pain was well controlled, he was ambulating, and tolerating a regular diet. He was discharged home.     Consults:     Significant Diagnostic Studies: see chart review    Pending Diagnostic Studies:     Procedure Component Value Units Date/Time    CBC auto differential [[id number]] Collected:  08/28/17 1316    Order Status:  Sent Lab Status:  In process Updated:  08/28/17 1316    Specimen:  Blood from Blood     Comprehensive metabolic panel [[id number]] Collected:  08/28/17 1316    Order Status:  Sent Lab Status:  In process Updated:  08/28/17 1316    Specimen:  Blood from Blood           Final Active Diagnoses:    Diagnosis Date Noted POA    PRINCIPAL PROBLEM:  Prostate cancer [C61] 07/25/2017 Yes    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    Specialty:   Urology  Why:  parada removal  Contact information:  [address]  [facility] [address]  [phone]                 Patient Instructions:     Diet general     Call MD for:  severe uncontrolled pain     Call MD for:  temperature >100.4     Call MD for:  persistent nausea and vomiting or diarrhea     Call MD for:  redness, tenderness, or signs of infection (pain, swelling, redness, odor or green/yellow discharge around incision site)     Call MD for:  difficulty breathing or increased cough     Call MD for:  severe persistent headache     Call MD for:  worsening rash     Call MD for:  persistent dizziness, light-headedness, or visual disturbances     Call MD for:  increased confusion or weakness     No dressing needed     Type And Screen Preop   Standing Status: Future  Standing Exp. Date: 10/21/18       Medications:  Reconciled Home Medications:   Discharge Medication List as of 8/29/2017  9:56 AM      START taking these medications    Details   oxycodone-acetaminophen (PERCOCET) 5-325 mg per tablet Take 1 tablet by mouth every 4 (four) hours as needed for Pain., Starting Tue 8/29/2017, Print      polyethylene glycol (GLYCOLAX) 17 gram PwPk Take 17 g by mouth once daily., Starting Tue 8/29/2017, Print      sulfamethoxazole-trimethoprim 800-160mg (BACTRIM DS) 800-160 mg Tab Take 1 tablet by mouth 2 (two) times daily., Starting Tue 8/29/2017, Until Fri 9/1/2017, Print         CONTINUE these medications which have NOT CHANGED    Details   FREESTYLE LITE STRIPS Strp TEST BLOOD SUGAR D, Historical Med      JANUMET  mg per tablet TK 1 T PO BID, Historical Med      metoprolol succinate (TOPROL-XL) 100 MG 24 hr tablet TK 1 T PO D, Historical Med      pravastatin (PRAVACHOL) 40 MG tablet TK 1 T PO D, Historical Med      sertraline (ZOLOFT) 50 MG tablet TK 1 T PO D, Historical Med      tamsulosin (FLOMAX) 0.4 mg Cp24 TK 1 C PO D, Historical Med      methotrexate 2.5 MG Tab TK 10 TS PO WEEKLY, Historical Med              Time spent on the discharge of patient: 20 minutes    Mariana Conrad MD  Urology  [facility]

## 2017-09-03 ENCOUNTER — NURSE TRIAGE (OUTPATIENT)
Dept: ADMINISTRATIVE | Facility: CLINIC | Age: 62
End: 2017-09-03

## 2017-09-03 ENCOUNTER — HOSPITAL ENCOUNTER (EMERGENCY)
Facility: HOSPITAL | Age: 62
Discharge: HOME | End: 2017-09-03
Attending: INTERNAL MEDICINE
Payer: COMMERCIAL

## 2017-09-03 VITALS
RESPIRATION RATE: 18 BRPM | WEIGHT: 218 LBS | OXYGEN SATURATION: 97 % | TEMPERATURE: 97 F | DIASTOLIC BLOOD PRESSURE: 84 MMHG | BODY MASS INDEX: 30.4 KG/M2 | SYSTOLIC BLOOD PRESSURE: 136 MMHG | HEART RATE: 82 BPM

## 2017-09-03 DIAGNOSIS — N36.8 URETHRAL MEATUS PAIN: Primary | ICD-10-CM

## 2017-09-03 PROCEDURE — 99283 EMERGENCY DEPT VISIT LOW MDM: CPT

## 2017-09-03 PROCEDURE — 25000003 PHARM REV CODE 250: Performed by: INTERNAL MEDICINE

## 2017-09-03 RX ORDER — LIDOCAINE HYDROCHLORIDE 20 MG/ML
5 JELLY TOPICAL
Status: COMPLETED | OUTPATIENT
Start: 2017-09-03 | End: 2017-09-03

## 2017-09-03 RX ADMIN — LIDOCAINE HYDROCHLORIDE 5 ML: 20 JELLY TOPICAL at 02:09

## 2017-09-03 NOTE — ED PROVIDER NOTES
Encounter Date: 9/3/2017       History     Chief Complaint   Patient presents with    General Illness     wants parada cath removed       Male  Problem   Primary symptoms include penile pain.  Primary symptoms include no dysuria. Primary symptoms comment: Parada catheter placed 6 days ago post-op now hurting urethra. Pt says he wants it removed as scheduled today.. The current episode started yesterday. The problem has been unchanged. The symptoms occur spontaneously. Sexual activity: non-contributory.     Review of patient's allergies indicates:   Allergen Reactions    Penicillins Rash    Wellbutrin [bupropion hcl] Rash     Past Medical History:   Diagnosis Date    Allergy     COPD (chronic obstructive pulmonary disease)     Diabetes mellitus     Diabetes mellitus due to abnormal insulin 7/25/2017    Elevated PSA     Hyperlipemia     Hypertension      Past Surgical History:   Procedure Laterality Date    BACK SURGERY  1974    lamputation of little finger tip  1979    MOUTH SURGERY  1972    tonsilectomy  1960     Family History   Problem Relation Age of Onset    Hypertension Father     Cancer Mother     Cancer Paternal Grandmother     Prostate cancer Paternal Grandfather     Cancer Maternal Grandmother      Social History   Substance Use Topics    Smoking status: Former Smoker     Packs/day: 1.50     Types: Cigarettes, Vaping with nicotine     Start date: 1972     Quit date: 2014    Smokeless tobacco: Never Used    Alcohol use No     Review of Systems   Genitourinary: Positive for penile pain. Negative for dysuria.   All other systems reviewed and are negative.      Physical Exam     Initial Vitals [09/03/17 1252]   BP Pulse Resp Temp SpO2   136/84 82 18 96.7 °F (35.9 °C) 97 %      MAP       101.33         Physical Exam    Nursing note and vitals reviewed.  Constitutional: He appears well-developed and well-nourished.   HENT:   Head: Normocephalic and atraumatic.   Nose: Nose normal.   Eyes:  Conjunctivae and EOM are normal. Pupils are equal, round, and reactive to light.   Neck: Normal range of motion. Neck supple.   Cardiovascular: Normal rate, regular rhythm, normal heart sounds and intact distal pulses.   Pulmonary/Chest: Breath sounds normal.   Abdominal: Soft. Bowel sounds are normal.   Genitourinary:   Genitourinary Comments: erythema of urethral meatus; Zelaya catheter in place.   Musculoskeletal: Normal range of motion.   Neurological: He is alert and oriented to person, place, and time. He has normal strength.   Skin: Skin is warm and dry. Capillary refill takes less than 2 seconds.         ED Course   Procedures  Labs Reviewed - No data to display          Medical Decision Making:   Initial Assessment:   Indwelling Zelaya irritating urethral meatus  Differential Diagnosis:   Urethral pain  Urethral irritation  ED Management:  The catheter was removed and the patient was discharged in stable condition. He was able to void following catheter removal.                   ED Course      Clinical Impression:   The encounter diagnosis was Urethral meatus pain.    Disposition:   Disposition: Discharged  Condition: Stable                        Suki Butt MD  09/16/17 0137

## 2017-09-03 NOTE — ED NOTES
REMOVED TRAVIS CATH ASEPTIC TECHNIQUE.  CATHETER INTACT.  PT STATES HE FEELS MUCH BETTER.  LIDOCAINE GEL APPLIED TO SITE AS DIRECTED.

## 2017-09-03 NOTE — TELEPHONE ENCOUNTER
"  Reason for Disposition   Sounds like a serious complication to the triager    Answer Assessment - Initial Assessment Questions  1. SYMPTOM: "What's the main symptom you're concerned about?" (e.g., pain, fever, vomiting)      Pain in Penis  2. ONSET: "When did ________  start?"      Hurting since the beginning  3. SURGERY: "What surgery was performed?"      Prostrate ca  4. DATE of SURGERY: "When was surgery performed?"       monday  5. ANESTHESIA: " What type of anesthesia did you have?" (e.g., general, spinal, epidural, local)      general  6. PAIN: "Is there any pain?" If so, ask: "How bad is it?"  (Scale 1-10; or mild, moderate, severe)      Penis/forskin  7. FEVER: "Do you have a fever?" If so, ask: "What is your temperature, how was it measured, and when did it start?"      Maybe last night  8. VOMITING: "Is there any vomiting?" If yes, ask: "How many times?"      no  9. BLEEDING: "Is there any bleeding?" If so, ask: "How much?" and "Where?"      no  10. OTHER SYMPTOMS: "Do you have any other symptoms?" (e.g., drainage from wound, painful urination, constipation)        Painful/stool softner    Protocols used: ST POST-OP SYMPTOMS AND [id number]    "

## 2017-09-03 NOTE — ED TRIAGE NOTES
STATED HE HAD A PROCEDURE DONE WHICH REQUIRED A TRAVIS CATH PLACED.  IT IS SCHEDULED FOR REMOVAL IN 2 DAYS BUT DUE TO DISCOMFORT HE WOULD LIKE IT REMOVED NOW.  CLEAR YELLOW URINE NOTED TO TRAVIS BAG.

## 2017-09-05 ENCOUNTER — OFFICE VISIT (OUTPATIENT)
Dept: UROLOGY | Facility: CLINIC | Age: 62
End: 2017-09-05
Payer: COMMERCIAL

## 2017-09-05 VITALS
BODY MASS INDEX: 29.32 KG/M2 | HEART RATE: 64 BPM | SYSTOLIC BLOOD PRESSURE: 133 MMHG | HEIGHT: 71 IN | DIASTOLIC BLOOD PRESSURE: 74 MMHG | WEIGHT: 209.44 LBS

## 2017-09-05 DIAGNOSIS — C61 PROSTATE CANCER: Primary | ICD-10-CM

## 2017-09-05 PROCEDURE — 99499 UNLISTED E&M SERVICE: CPT | Mod: S$GLB,,, | Performed by: UROLOGY

## 2017-09-05 PROCEDURE — 99999 PR PBB SHADOW E&M-EST. PATIENT-LVL III: CPT | Mod: PBBFAC,,, | Performed by: UROLOGY

## 2017-09-05 RX ORDER — TADALAFIL 20 MG/1
20 TABLET ORAL DAILY PRN
Qty: 10 TABLET | Refills: 11 | Status: SHIPPED | OUTPATIENT
Start: 2017-09-05 | End: 2017-10-05

## 2017-09-05 NOTE — PROGRESS NOTES
"Clinic Note  9/5/2017      Subjective:         Chief Complaint:   CALEB Knott is a 62 y.o. male S/P robotic assisted laparoscopic prostatectomy on 8/28/2017. Path pending.      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 7/25/2017    Elevated PSA     Hyperlipemia     Hypertension      Family History   Problem Relation Age of Onset    Hypertension Father     Cancer Mother     Cancer Paternal Grandmother     Prostate cancer Paternal Grandfather     Cancer Maternal Grandmother      Social History     Social History    Marital status:      Spouse name: N/A    Number of children: N/A    Years of education: N/A     Occupational History          Social History Main Topics    Smoking status: Former Smoker     Packs/day: 1.50     Types: Cigarettes, Vaping with nicotine     Start date: 1972     Quit date: 2014    Smokeless tobacco: Never Used    Alcohol use No    Drug use: No    Sexual activity: Yes     Partners: Female     Other Topics Concern    Not on file     Social History Narrative    No narrative on file     Past Surgical History:   Procedure Laterality Date    BACK SURGERY  1974    lamputation of little finger tip  1979    MOUTH SURGERY  1972    tonsilectomy  1960     Patient Active Problem List   Diagnosis    Prostate cancer    Diabetes mellitus due to abnormal insulin    Hypertension    Psoriasis     Review of Systems      Objective:      There were no vitals taken for this visit.  Estimated body mass index is 30.4 kg/m² as calculated from the following:    Height as of 8/28/17: 5' 11" (1.803 m).    Weight as of 9/3/17: 98.9 kg (218 lb).  Physical Exam      Assessment and Plan:           Problem List Items Addressed This Visit     Prostate cancer - Primary      Other " Visit Diagnoses    None.         Follow up:   1 month with PSA. Appointment with Dr. Navarro. Rocío Meade.    Nickolas Kim

## 2017-10-03 ENCOUNTER — LAB VISIT (OUTPATIENT)
Dept: LAB | Facility: HOSPITAL | Age: 62
End: 2017-10-03
Attending: UROLOGY
Payer: COMMERCIAL

## 2017-10-03 DIAGNOSIS — C61 PROSTATE CANCER: ICD-10-CM

## 2017-10-03 LAB — COMPLEXED PSA SERPL-MCNC: <0.01 NG/ML

## 2017-10-03 PROCEDURE — 84153 ASSAY OF PSA TOTAL: CPT

## 2017-10-03 PROCEDURE — 36415 COLL VENOUS BLD VENIPUNCTURE: CPT

## 2017-10-05 ENCOUNTER — OFFICE VISIT (OUTPATIENT)
Dept: UROLOGY | Facility: CLINIC | Age: 62
End: 2017-10-05
Payer: COMMERCIAL

## 2017-10-05 ENCOUNTER — LAB VISIT (OUTPATIENT)
Dept: LAB | Facility: HOSPITAL | Age: 62
End: 2017-10-05
Attending: UROLOGY
Payer: COMMERCIAL

## 2017-10-05 VITALS
HEART RATE: 59 BPM | HEIGHT: 71 IN | SYSTOLIC BLOOD PRESSURE: 131 MMHG | WEIGHT: 207.25 LBS | DIASTOLIC BLOOD PRESSURE: 69 MMHG | BODY MASS INDEX: 29.02 KG/M2

## 2017-10-05 VITALS
HEIGHT: 71 IN | WEIGHT: 216 LBS | DIASTOLIC BLOOD PRESSURE: 68 MMHG | SYSTOLIC BLOOD PRESSURE: 141 MMHG | BODY MASS INDEX: 30.24 KG/M2 | HEART RATE: 55 BPM

## 2017-10-05 DIAGNOSIS — I10 HYPERTENSION, UNSPECIFIED TYPE: ICD-10-CM

## 2017-10-05 DIAGNOSIS — C61 PROSTATE CANCER: ICD-10-CM

## 2017-10-05 DIAGNOSIS — N52.31 ERECTILE DYSFUNCTION FOLLOWING RADICAL PROSTATECTOMY: ICD-10-CM

## 2017-10-05 DIAGNOSIS — E13.9 DIABETES MELLITUS DUE TO ABNORMAL INSULIN: ICD-10-CM

## 2017-10-05 DIAGNOSIS — C61 PROSTATE CANCER: Primary | ICD-10-CM

## 2017-10-05 DIAGNOSIS — N52.31 ERECTILE DYSFUNCTION FOLLOWING RADICAL PROSTATECTOMY: Primary | ICD-10-CM

## 2017-10-05 DIAGNOSIS — N39.3 MALE STRESS INCONTINENCE: ICD-10-CM

## 2017-10-05 LAB — TESTOST SERPL-MCNC: 536 NG/DL

## 2017-10-05 PROCEDURE — 36415 COLL VENOUS BLD VENIPUNCTURE: CPT

## 2017-10-05 PROCEDURE — 99214 OFFICE O/P EST MOD 30 MIN: CPT | Mod: 24,S$GLB,, | Performed by: UROLOGY

## 2017-10-05 PROCEDURE — 99999 PR PBB SHADOW E&M-EST. PATIENT-LVL III: CPT | Mod: PBBFAC,,, | Performed by: UROLOGY

## 2017-10-05 PROCEDURE — 84403 ASSAY OF TOTAL TESTOSTERONE: CPT

## 2017-10-05 PROCEDURE — 99499 UNLISTED E&M SERVICE: CPT | Mod: S$GLB,,, | Performed by: UROLOGY

## 2017-10-05 RX ORDER — TADALAFIL 5 MG/1
TABLET ORAL
Qty: 30 TABLET | Refills: 11 | Status: SHIPPED | OUTPATIENT
Start: 2017-10-05 | End: 2018-01-10 | Stop reason: SDUPTHER

## 2017-10-05 NOTE — PROGRESS NOTES
Clinic Note  10/5/2017      Subjective:         Chief Complaint:   CALEB Knott is a 62 y.o. male S/P robotic assisted laparoscopic prostatectomy on 8/28/2017.  1 pad/ day, 1 at night. Doing Kegels.      Path:  Procedure: Radical prostatectomy  Histologic Type: Adenocarcinoma (acinar, not otherwise specified)  Histologic Grade:  [place] Pattern  Primary Pattern: 3  Secondary Pattern: Pattern 3  Tertiary Pattern: Pattern 4  Total Hira Score: 6  Grade Group: Grade group 1  Tumor Quantitation: Tumor size (dominant nodule, if present): 1.9 cm in greatest dimension  Extraprostatic Extension (EPE): Present, nonfocal  Urinary Bladder Neck Invasion: Not identified  Seminal Vesicle Invasion (invasion of muscular wall required): Not identified  Margins: Uninvolved by invasive carcinoma  Benign prostate glands present at surgical margin at the apex  Lymphovascular Invasion: Not identified  Perineural Invasion: Present  Regional Lymph Nodes  Number of Lymph Nodes Involved: 0  Number of Lymph Nodes Examined: 4  Pathologic Stage Classification (pTNM, AJCC 7th Edition): p T3a N0        Lab Results   Component Value Date    PSADIAG <0.01 10/03/2017      Past Medical History:   Diagnosis Date    Allergy     COPD (chronic obstructive pulmonary disease)     Diabetes mellitus     Diabetes mellitus due to abnormal insulin 7/25/2017    Hyperlipemia     Hypertension      Family History   Problem Relation Age of Onset    Hypertension Father     Cancer Mother     Cancer Paternal Grandmother     Prostate cancer Paternal Grandfather     Cancer Maternal Grandmother      Social History     Social History    Marital status:      Spouse name: N/A    Number of children: N/A    Years of education: N/A     Occupational History          Social History Main Topics    Smoking status: Former Smoker     Packs/day: 1.50     Types: Cigarettes, Vaping with nicotine     Start date: 1972     Quit date: 2014     "Smokeless tobacco: Never Used    Alcohol use No    Drug use: No    Sexual activity: Yes     Partners: Female     Other Topics Concern    Not on file     Social History Narrative    No narrative on file     Past Surgical History:   Procedure Laterality Date    BACK SURGERY  1974    lamputation of little finger tip  1979    MOUTH SURGERY  1972    tonsilectomy  1960     Patient Active Problem List   Diagnosis    Prostate cancer    Diabetes mellitus due to abnormal insulin    Hypertension    Psoriasis     Review of Systems   Constitutional: Negative for appetite change, chills, fatigue, fever and unexpected weight change.   HENT: Negative for nosebleeds.    Respiratory: Negative for shortness of breath and wheezing.    Cardiovascular: Negative for chest pain, palpitations and leg swelling.   Gastrointestinal: Negative for abdominal distention, abdominal pain, constipation, diarrhea, nausea and vomiting.   Genitourinary: Negative for dysuria and hematuria.   Musculoskeletal: Negative for arthralgias and back pain.   Skin: Negative for pallor.   Neurological: Negative for dizziness, seizures and syncope.   Hematological: Negative for adenopathy.   Psychiatric/Behavioral: Negative for dysphoric mood.         Objective:      /69 (BP Location: Left arm, Patient Position: Sitting, BP Method: Medium (Automatic))   Pulse (!) 59   Ht 5' 11" (1.803 m)   Wt 94 kg (207 lb 3.7 oz)   BMI 28.90 kg/m²   Estimated body mass index is 28.9 kg/m² as calculated from the following:    Height as of this encounter: 5' 11" (1.803 m).    Weight as of this encounter: 94 kg (207 lb 3.7 oz).  Physical Exam   Abdominal:   Incisions healing well, no infection         Assessment and Plan:           Problem List Items Addressed This Visit     Prostate cancer - Primary      Other Visit Diagnoses    None.         Follow up:   4 months with [facility].  Jennifer Meade, 20 sec , 10 reps BID.  appointment with [facility].  Resume full " activity.  Letter to dr. Larson.      Nickolas Kim

## 2017-10-05 NOTE — PROGRESS NOTES
Mr. Knott is a 62 y.o. male presenting for a consultation at the request of Dr. Kim. Patient presents with ED.    PRESENTING ILLNESS:    Bert Knott is a 62 y.o. male s/p RALP on 8/28/17 by Dr. Kim.  Since then, he c/o ED.  Prior to surgery, he did have ED.  But he had a low libido.    He does have DHRUV.  He is wearing 1 pads/day.  No hematuria.  No dysuria.  Good FOS.    No bone pain or weight loss.    REVIEW OF SYSTEMS:    Bert Knott denies any history of headache, blurred vision, fever, nausea, vomiting, chills, flank discomfort, abdominal pain, bleeding per rectum, cough, SOB, recent loss of consciousness, recent mental status changes, seizures, dizziness, or upper or lower extremity weakness.    CECILIA  1. 1  2. 4  3. 4  4. 4  5. 4      PATIENT HISTORY:    Past Medical History:   Diagnosis Date    Allergy     COPD (chronic obstructive pulmonary disease)     Diabetes mellitus     Diabetes mellitus due to abnormal insulin 7/25/2017    Hyperlipemia     Hypertension        Family History   Problem Relation Age of Onset    Hypertension Father     Cancer Mother     Cancer Paternal Grandmother     Prostate cancer Paternal Grandfather     Cancer Maternal Grandmother        Past Surgical History:   Procedure Laterality Date    BACK SURGERY  1974    lamputation of little finger tip  1979    MOUTH SURGERY  1972    tonsilectomy  1960       Social History     Social History    Marital status:      Spouse name: N/A    Number of children: N/A    Years of education: N/A     Occupational History          Social History Main Topics    Smoking status: Former Smoker     Packs/day: 1.50     Types: Cigarettes, Vaping with nicotine     Start date: 1972     Quit date: 2014    Smokeless tobacco: Never Used    Alcohol use No    Drug use: No    Sexual activity: Yes     Partners: Female     Other Topics Concern    None     Social History Narrative    None       Review of  patient's allergies indicates:   Allergen Reactions    Penicillins Rash    Wellbutrin [bupropion hcl] Rash         Current Outpatient Prescriptions:     FREESTYLE LITE STRIPS Strp, TEST BLOOD SUGAR D, Disp: , Rfl: 3    JANUMET  mg per tablet, TK 1 T PO BID, Disp: , Rfl: 1    methotrexate 2.5 MG Tab, TK 10 TS PO WEEKLY, Disp: , Rfl: 0    metoprolol succinate (TOPROL-XL) 100 MG 24 hr tablet, TK 1 T PO D, Disp: , Rfl: 1    pravastatin (PRAVACHOL) 40 MG tablet, TK 1 T PO D, Disp: , Rfl: 1    sertraline (ZOLOFT) 50 MG tablet, TK 1 T PO D, Disp: , Rfl: 1    tadalafil (CIALIS) 20 MG Tab, Take 1 tablet (20 mg total) by mouth daily as needed., Disp: 10 tablet, Rfl: 11      PHYSICAL EXAMINATION:    The patient generally appears in good health, is appropriately interactive, and is in no apparent distress.     Eyes: anicteric sclerae, moist conjunctivae; no lid-lag; PERRLA     HENT: Atraumatic; oropharynx clear with moist mucous membranes and no mucosal ulcerations;normal hard and soft palate. No evidence of lymphadenopathy.    Neck: Trachea midline.  No thyromegaly.    Musculoskeletal: No abnormal gait.    Skin: No lesions.    Mental: Cooperative with normal affect.  Is oriented to time, place, and person.    Neuro: Grossly intact.    Chest: Normal inspiratory effort.   No accessory muscles.  No audible wheezes.  Respirations symmetric on inspiration and expiration.    Heart: Regular rhythm.      Abdomen:  Soft, non-tender. No masses or organomegaly. Bladder is not palpable. No evidence of flank discomfort. No evidence of inguinal hernia.    Genitourinary: The penis is circumcised with no evidence of plaques or induration. The urethral meatus is normal. The testes, epididymides, and cord structures are normal in size and contour bilaterally. The scrotum is normal in size and contour.    Extremities: No clubbing, cyanosis, or edema        LABS:      Lab Results   Component Value Date    PSADIAG <0.01 10/03/2017         IMPRESSION:    Encounter Diagnoses   Name Primary?    Erectile dysfunction following radical prostatectomy Yes    Prostate cancer     Hypertension, unspecified type     Diabetes mellitus due to abnormal insulin    HTN, controlled  DM, controlled    PLAN:    1. Discussed penile rehab today.  The risks, benefits, and the evidence was discussed with him today.  We discussed different options.  He would like to try Cialis.  Side effects discussed.  A new Rx was given.  2. RTC 3 months to re-evaluate.  3. Will draw a T for the decreased libido.    Copy to:

## 2017-10-05 NOTE — LETTER
October 5, 2017      Nickolas Kim MD  [address]  [place] [address]           [facility]  [address]  [facility] [address]  Phone: [phone]          Patient: Bert Knott   MR Number: [id number]   YOB: 1955   Date of Visit: 10/5/2017       Dear Dr. Nickolas Kim:    Thank you for referring Bert Knott to me for evaluation. Attached you will find relevant portions of my assessment and plan of care.    If you have questions, please do not hesitate to call me. I look forward to following Bert Knott along with you.    Sincerely,    Nain Navarro MD    Enclosure  CC:  No Recipients    If you would like to receive this communication electronically, please contact [email or web address] or [phone] to request more information on [employer] [facility] access.    For providers and/or their staff who would like to refer a patient to [facility], please contact us through our one-stop-shop provider referral line, [facility], at [phone].    If you feel you have received this communication in error or would no longer like to receive these types of communications, please e-mail [email or web address]

## 2017-10-05 NOTE — LETTER
October 5, 2017        Mike Larson MD  [address] [facility] [address]             [facility]  [address]  [place] [address]  Phone: [phone]   Patient: Bert Knott   MR Number: [id number]   YOB: 1955   Date of Visit: 10/5/2017       Dear Dr. Larson:    Thank you for referring Bert Knott to me for evaluation. Attached you will find relevant portions of my assessment and plan of care.    If you have questions, please do not hesitate to call me. I look forward to following Bert Knott along with you.    Sincerely,      Nickolas Kim MD            CC  No Recipients    Enclosure

## 2017-10-06 ENCOUNTER — TELEPHONE (OUTPATIENT)
Dept: UROLOGY | Facility: CLINIC | Age: 62
End: 2017-10-06

## 2018-01-10 ENCOUNTER — OFFICE VISIT (OUTPATIENT)
Dept: UROLOGY | Facility: CLINIC | Age: 63
End: 2018-01-10
Payer: COMMERCIAL

## 2018-01-10 VITALS
HEIGHT: 71 IN | WEIGHT: 216.88 LBS | HEART RATE: 45 BPM | SYSTOLIC BLOOD PRESSURE: 148 MMHG | BODY MASS INDEX: 30.36 KG/M2 | DIASTOLIC BLOOD PRESSURE: 86 MMHG

## 2018-01-10 DIAGNOSIS — I10 HYPERTENSION, UNSPECIFIED TYPE: ICD-10-CM

## 2018-01-10 DIAGNOSIS — N52.31 ERECTILE DYSFUNCTION FOLLOWING RADICAL PROSTATECTOMY: Primary | ICD-10-CM

## 2018-01-10 DIAGNOSIS — C61 PROSTATE CANCER: ICD-10-CM

## 2018-01-10 DIAGNOSIS — E13.9 DIABETES MELLITUS DUE TO ABNORMAL INSULIN: ICD-10-CM

## 2018-01-10 PROCEDURE — 99999 PR PBB SHADOW E&M-EST. PATIENT-LVL III: CPT | Mod: PBBFAC,,, | Performed by: UROLOGY

## 2018-01-10 PROCEDURE — 99214 OFFICE O/P EST MOD 30 MIN: CPT | Mod: S$GLB,,, | Performed by: UROLOGY

## 2018-01-10 RX ORDER — TADALAFIL 5 MG/1
TABLET ORAL
Qty: 30 TABLET | Refills: 11 | Status: SHIPPED | OUTPATIENT
Start: 2018-01-10 | End: 2019-02-14

## 2018-01-10 NOTE — PROGRESS NOTES
Mr. Knott is a 62 y.o. male presenting for a consultation at the request of Dr. Kim. Patient presents with ED.    PRESENTING ILLNESS:    Bert Knott is a 62 y.o. male s/p RALP on 8/28/17 by Dr. Kim.  Since then, he c/o ED.  Prior to surgery, he did have not ED.      He does have not DHRUV.  He is wearing 0 pads/day.  No hematuria.  No dysuria.  Good FOS.    No bone pain or weight loss.    REVIEW OF SYSTEMS:    Bert Knott denies any history of headache, blurred vision, fever, nausea, vomiting, chills, flank discomfort, abdominal pain, bleeding per rectum, cough, SOB, recent loss of consciousness, recent mental status changes, seizures, dizziness, or upper or lower extremity weakness.    CECILIA  1. 1  2. 1  3. 1  4. 1  5. 1       PATIENT HISTORY:    Past Medical History:   Diagnosis Date    Allergy     COPD (chronic obstructive pulmonary disease)     Diabetes mellitus     Diabetes mellitus due to abnormal insulin 7/25/2017    Hyperlipemia     Hypertension        Family History   Problem Relation Age of Onset    Hypertension Father     Cancer Mother     Cancer Paternal Grandmother     Prostate cancer Paternal Grandfather     Cancer Maternal Grandmother        Past Surgical History:   Procedure Laterality Date    BACK SURGERY  1974    lamputation of little finger tip  1979    MOUTH SURGERY  1972    tonsilectomy  1960       Social History     Social History    Marital status:      Spouse name: N/A    Number of children: N/A    Years of education: N/A     Occupational History          Social History Main Topics    Smoking status: Light Tobacco Smoker     Packs/day: 1.50     Types: Vaping with nicotine     Start date: 1972     Last attempt to quit: 2014    Smokeless tobacco: Never Used      Comment: currently vapes, former cigarette smoker    Alcohol use No    Drug use: No    Sexual activity: Yes     Partners: Female     Other Topics Concern    None     Social  History Narrative    None       Review of patient's allergies indicates:   Allergen Reactions    Penicillins Rash    Wellbutrin [bupropion hcl] Rash         Current Outpatient Prescriptions:     FREESTYLE LITE STRIPS Strp, TEST BLOOD SUGAR D, Disp: , Rfl: 3    JANUMET  mg per tablet, TK 1 T PO BID, Disp: , Rfl: 1    methotrexate 2.5 MG Tab, TK 10 TS PO WEEKLY, Disp: , Rfl: 0    metoprolol succinate (TOPROL-XL) 100 MG 24 hr tablet, TK 1 T PO D, Disp: , Rfl: 1    pravastatin (PRAVACHOL) 40 MG tablet, TK 1 T PO D, Disp: , Rfl: 1    sertraline (ZOLOFT) 50 MG tablet, TK 1 T PO D, Disp: , Rfl: 1    tadalafil (CIALIS) 5 MG tablet, Take 1 PO QOD, Disp: 30 tablet, Rfl: 11      PHYSICAL EXAMINATION:    The patient generally appears in good health, is appropriately interactive, and is in no apparent distress.     Eyes: anicteric sclerae, moist conjunctivae; no lid-lag; PERRLA     HENT: Atraumatic; oropharynx clear with moist mucous membranes and no mucosal ulcerations;normal hard and soft palate. No evidence of lymphadenopathy.    Neck: Trachea midline.  No thyromegaly.    Musculoskeletal: No abnormal gait.    Skin: No lesions.    Mental: Cooperative with normal affect.  Is oriented to time, place, and person.    Neuro: Grossly intact.    Chest: Normal inspiratory effort.   No accessory muscles.  No audible wheezes.  Respirations symmetric on inspiration and expiration.    Heart: Regular rhythm.      Abdomen:  Soft, non-tender. No masses or organomegaly. Bladder is not palpable. No evidence of flank discomfort. No evidence of inguinal hernia.    Genitourinary: The penis is circumcised with no evidence of plaques or induration. The urethral meatus is normal. The testes, epididymides, and cord structures are normal in size and contour bilaterally. The scrotum is normal in size and contour.    Extremities: No clubbing, cyanosis, or edema        LABS:      Lab Results   Component Value Date    PSADIAG <0.01  10/03/2017        IMPRESSION:    Encounter Diagnoses   Name Primary?    Erectile dysfunction following radical prostatectomy Yes    Prostate cancer     Hypertension, unspecified type     Diabetes mellitus due to abnormal insulin    HTN, controlled  DM, controlled    PLAN:    1. Continue Cialis for the ED. Refilled today.  2. RTC 6 months to re-evaluate.      Copy to:

## 2018-02-05 ENCOUNTER — LAB VISIT (OUTPATIENT)
Dept: LAB | Facility: HOSPITAL | Age: 63
End: 2018-02-05
Attending: UROLOGY
Payer: COMMERCIAL

## 2018-02-05 DIAGNOSIS — C61 PROSTATE CANCER: ICD-10-CM

## 2018-02-05 LAB — COMPLEXED PSA SERPL-MCNC: <0.01 NG/ML

## 2018-02-05 PROCEDURE — 36415 COLL VENOUS BLD VENIPUNCTURE: CPT

## 2018-02-05 PROCEDURE — 84153 ASSAY OF PSA TOTAL: CPT

## 2018-02-07 ENCOUNTER — TELEPHONE (OUTPATIENT)
Dept: UROLOGY | Facility: CLINIC | Age: 63
End: 2018-02-07

## 2018-02-07 NOTE — TELEPHONE ENCOUNTER
----- Message from Sandra Velasquez MA sent at 2/7/2018  4:19 PM CST -----  Contact: [phone] Xiomara (wife)   Pt had a PSA on 2/5/18 but he does not have a follow up, asking if he needs to be seen?    [phone] Xiomara (wife)

## 2018-02-15 ENCOUNTER — OFFICE VISIT (OUTPATIENT)
Dept: UROLOGY | Facility: CLINIC | Age: 63
End: 2018-02-15
Payer: COMMERCIAL

## 2018-02-15 VITALS
BODY MASS INDEX: 30.52 KG/M2 | WEIGHT: 218 LBS | HEART RATE: 72 BPM | HEIGHT: 71 IN | SYSTOLIC BLOOD PRESSURE: 138 MMHG | RESPIRATION RATE: 15 BRPM | DIASTOLIC BLOOD PRESSURE: 84 MMHG

## 2018-02-15 DIAGNOSIS — C61 PROSTATE CANCER: Primary | ICD-10-CM

## 2018-02-15 PROCEDURE — 99214 OFFICE O/P EST MOD 30 MIN: CPT | Mod: S$GLB,,, | Performed by: UROLOGY

## 2018-02-15 PROCEDURE — 99999 PR PBB SHADOW E&M-EST. PATIENT-LVL III: CPT | Mod: PBBFAC,,, | Performed by: UROLOGY

## 2018-02-15 PROCEDURE — 3008F BODY MASS INDEX DOCD: CPT | Mod: S$GLB,,, | Performed by: UROLOGY

## 2018-02-15 NOTE — PROGRESS NOTES
Clinic Note  2/15/2018      Subjective:         Chief Complaint:   CALEB Knott is a 62 y.o. male S/P robotic assisted laparoscopic prostatectomy on 8/28/2017.Here with his wife Xiomara.  Rarely leaks, doing Kegels.     Path:  Procedure: Radical prostatectomy  Histologic Type: Adenocarcinoma (acinar, not otherwise specified)  Histologic Grade:  [place] Pattern  Primary Pattern: 3  Secondary Pattern: Pattern 3  Tertiary Pattern: Pattern 4  Total [place] Score: 6  Grade Group: Grade group 1  Tumor Quantitation: Tumor size (dominant nodule, if present): 1.9 cm in greatest dimension  Extraprostatic Extension (EPE): Present, nonfocal  Urinary Bladder Neck Invasion: Not identified  Seminal Vesicle Invasion (invasion of muscular wall required): Not identified  Margins: Uninvolved by invasive carcinoma  Benign prostate glands present at surgical margin at the apex  Lymphovascular Invasion: Not identified  Perineural Invasion: Present  Regional Lymph Nodes  Number of Lymph Nodes Involved: 0  Number of Lymph Nodes Examined: 4  Pathologic Stage Classification (pTNM, AJCC 7th Edition): p T3a N0      Lab Results   Component Value Date    PSADIAG <0.01 02/05/2018    PSADIAG <0.01 10/03/2017      Past Medical History:   Diagnosis Date    Allergy     COPD (chronic obstructive pulmonary disease)     Diabetes mellitus     Diabetes mellitus due to abnormal insulin 7/25/2017    Hyperlipemia     Hypertension      Family History   Problem Relation Age of Onset    Hypertension Father     Cancer Mother     Cancer Paternal Grandmother     Prostate cancer Paternal Grandfather     Cancer Maternal Grandmother      Social History     Social History    Marital status:      Spouse name: N/A    Number of children: N/A    Years of education: N/A     Occupational History          Social History Main Topics    Smoking status: Light Tobacco Smoker     Packs/day: 1.50     Types: Vaping with nicotine     Start  "date: 1972     Last attempt to quit: 2014    Smokeless tobacco: Never Used      Comment: currently vapes, former cigarette smoker    Alcohol use No    Drug use: No    Sexual activity: Yes     Partners: Female     Other Topics Concern    Not on file     Social History Narrative    No narrative on file     Past Surgical History:   Procedure Laterality Date    BACK SURGERY  1974    lamputation of little finger tip  1979    MOUTH SURGERY  1972    tonsilectomy  1960     Patient Active Problem List   Diagnosis    Prostate cancer    Diabetes mellitus due to abnormal insulin    Hypertension    Psoriasis    Male stress incontinence    Erectile dysfunction following radical prostatectomy     Review of Systems   Constitutional: Negative for appetite change, chills, fatigue, fever and unexpected weight change.   HENT: Negative for nosebleeds.    Respiratory: Negative for shortness of breath and wheezing.    Cardiovascular: Negative for chest pain, palpitations and leg swelling.   Gastrointestinal: Negative for abdominal distention, abdominal pain, constipation, diarrhea, nausea and vomiting.   Genitourinary: Negative for dysuria and hematuria.   Musculoskeletal: Negative for arthralgias and back pain.   Skin: Negative for pallor.   Neurological: Negative for dizziness, seizures and syncope.   Hematological: Negative for adenopathy.   Psychiatric/Behavioral: Negative for dysphoric mood.         Objective:      There were no vitals taken for this visit.  Estimated body mass index is 30.25 kg/m² as calculated from the following:    Height as of 1/10/18: 5' 11" (1.803 m).    Weight as of 1/10/18: 98.4 kg (216 lb 13.9 oz).  Physical Exam   Constitutional: He is oriented to person, place, and time. He appears well-developed and well-nourished. No distress.   HENT:   Head: Atraumatic.   Neck: No tracheal deviation present.   Cardiovascular: Normal rate.    Pulmonary/Chest: Effort normal. No respiratory distress. He has " no wheezes.   Abdominal: Soft. Bowel sounds are normal. He exhibits no distension and no mass. There is no tenderness. There is no rebound and no guarding.   Neurological: He is alert and oriented to person, place, and time.   Skin: Skin is warm and dry. He is not diaphoretic.     Psychiatric: He has a normal mood and affect. His behavior is normal. Judgment and thought content normal.         Assessment and Plan:           Problem List Items Addressed This Visit     Prostate cancer - Primary          Follow up:       Nickolas Kim

## 2018-02-24 PROBLEM — R55 SYNCOPE: Status: ACTIVE | Noted: 2018-02-24

## 2018-03-25 ENCOUNTER — HOSPITAL ENCOUNTER (OUTPATIENT)
Dept: SLEEP MEDICINE | Facility: HOSPITAL | Age: 63
Discharge: HOME | End: 2018-03-25
Attending: INTERNAL MEDICINE
Payer: COMMERCIAL

## 2018-03-26 ENCOUNTER — HOSPITAL ENCOUNTER (OUTPATIENT)
Dept: SLEEP MEDICINE | Facility: HOSPITAL | Age: 63
Discharge: HOME | End: 2018-03-26
Attending: INTERNAL MEDICINE
Payer: COMMERCIAL

## 2018-03-26 DIAGNOSIS — G47.8 SLEEP AROUSAL DISORDER: ICD-10-CM

## 2018-03-26 PROCEDURE — 95806 SLEEP STUDY UNATT&RESP EFFT: CPT

## 2018-08-16 ENCOUNTER — TELEPHONE (OUTPATIENT)
Dept: UROLOGY | Facility: CLINIC | Age: 63
End: 2018-08-16

## 2018-08-16 NOTE — TELEPHONE ENCOUNTER
----- Message from William Payne LPN sent at 8/15/2018  1:56 PM CDT -----  Contact: self  [phone]      ----- Message -----  From: Ela Kraft MA  Sent: 8/15/2018   1:48 PM  To: Judy JONES Staff    Needs Advice    Reason for call:    I am calling to find out if you receive my outside lab test form my PCP.    Communication Preference:  Phone# above  Additional Information:  na

## 2018-08-21 ENCOUNTER — OFFICE VISIT (OUTPATIENT)
Dept: UROLOGY | Facility: CLINIC | Age: 63
End: 2018-08-21
Payer: COMMERCIAL

## 2018-08-21 VITALS
DIASTOLIC BLOOD PRESSURE: 68 MMHG | SYSTOLIC BLOOD PRESSURE: 112 MMHG | HEART RATE: 54 BPM | BODY MASS INDEX: 30.13 KG/M2 | WEIGHT: 216.06 LBS

## 2018-08-21 DIAGNOSIS — C61 PROSTATE CANCER: Primary | ICD-10-CM

## 2018-08-21 PROCEDURE — 3078F DIAST BP <80 MM HG: CPT | Mod: CPTII,S$GLB,, | Performed by: UROLOGY

## 2018-08-21 PROCEDURE — 99214 OFFICE O/P EST MOD 30 MIN: CPT | Mod: S$GLB,,, | Performed by: UROLOGY

## 2018-08-21 PROCEDURE — 3008F BODY MASS INDEX DOCD: CPT | Mod: CPTII,S$GLB,, | Performed by: UROLOGY

## 2018-08-21 PROCEDURE — 99999 PR PBB SHADOW E&M-EST. PATIENT-LVL III: CPT | Mod: PBBFAC,,, | Performed by: UROLOGY

## 2018-08-21 PROCEDURE — 3074F SYST BP LT 130 MM HG: CPT | Mod: CPTII,S$GLB,, | Performed by: UROLOGY

## 2018-08-21 RX ORDER — METFORMIN HYDROCHLORIDE 500 MG/1
1000 TABLET, EXTENDED RELEASE ORAL DAILY
COMMUNITY
Start: 2018-08-15

## 2018-08-21 NOTE — PROGRESS NOTES
Clinic Note  8/21/2018      Subjective:         Chief Complaint:   CALEB Knott is a 63 y.o. male S/P robotic assisted laparoscopic prostatectomy on 8/28/2017.Here with his wife Xiomara.  Continent and pad free, doing Kegels. Here with his wife Xiomara.   PSA <0.1.     Path:  Procedure: Radical prostatectomy  Histologic Type: Adenocarcinoma (acinar, not otherwise specified)  Histologic Grade:  [place] Pattern  Primary Pattern: 3  Secondary Pattern: Pattern 3  Tertiary Pattern: Pattern 4  Total [place] Score: 6  Grade Group: Grade group 1  Tumor Quantitation: Tumor size (dominant nodule, if present): 1.9 cm in greatest dimension  Extraprostatic Extension (EPE): Present, nonfocal  Urinary Bladder Neck Invasion: Not identified  Seminal Vesicle Invasion (invasion of muscular wall required): Not identified  Margins: Uninvolved by invasive carcinoma  Benign prostate glands present at surgical margin at the apex  Lymphovascular Invasion: Not identified  Perineural Invasion: Present  Regional Lymph Nodes  Number of Lymph Nodes Involved: 0  Number of Lymph Nodes Examined: 4  Pathologic Stage Classification (pTNM, AJCC 7th Edition): p T3a N0      Lab Results   Component Value Date    PSADIAG <0.01 02/05/2018    PSADIAG <0.01 10/03/2017      Past Medical History:   Diagnosis Date    Allergy     COPD (chronic obstructive pulmonary disease)     Diabetes mellitus     Diabetes mellitus due to abnormal insulin 7/25/2017    Fracture, ribs     Hyperlipemia     Hypertension     Prostate cancer 08/2017    Seizure      Family History   Problem Relation Age of Onset    Hypertension Father     Cancer Mother     Cancer Paternal Grandmother     Prostate cancer Paternal Grandfather     Cancer Maternal Grandmother      Social History     Socioeconomic History    Marital status:      Spouse name: Not on file    Number of children: Not on file    Years of education: Not on file    Highest education level: Not  on file   Social Needs    Financial resource strain: Not on file    Food insecurity - worry: Not on file    Food insecurity - inability: Not on file    Transportation needs - medical: Not on file    Transportation needs - non-medical: Not on file   Occupational History    Occupation:    Tobacco Use    Smoking status: Light Tobacco Smoker     Packs/day: 1.50     Types: Vaping with nicotine     Start date:      Last attempt to quit:      Years since quittin.6    Smokeless tobacco: Never Used    Tobacco comment: currently vapes, former cigarette smoker   Substance and Sexual Activity    Alcohol use: No    Drug use: No    Sexual activity: Yes     Partners: Female   Other Topics Concern    Not on file   Social History Narrative    Not on file     Past Surgical History:   Procedure Laterality Date    BACK SURGERY      lamputation of little finger tip  1979    MOUTH SURGERY      tonsilectomy       Patient Active Problem List   Diagnosis    Prostate cancer    Diabetes mellitus due to abnormal insulin    Hypertension    Psoriasis    Male stress incontinence    Erectile dysfunction following radical prostatectomy    Syncope    STEMI (ST elevation myocardial infarction)    Sleep arousal disorder     Review of Systems   Constitutional: Negative for appetite change, chills, fatigue, fever and unexpected weight change.   HENT: Negative for nosebleeds.    Respiratory: Negative for shortness of breath and wheezing.    Cardiovascular: Negative for chest pain, palpitations and leg swelling.   Gastrointestinal: Negative for abdominal distention, abdominal pain, constipation, diarrhea, nausea and vomiting.   Genitourinary: Negative for dysuria and hematuria.   Musculoskeletal: Negative for arthralgias and back pain.   Skin: Negative for pallor.   Neurological: Negative for dizziness, seizures and syncope.   Hematological: Negative for adenopathy.   Psychiatric/Behavioral:  "Negative for dysphoric mood.         Objective:      There were no vitals taken for this visit.  Estimated body mass index is 29.71 kg/m² as calculated from the following:    Height as of 2/24/18: 5' 11" (1.803 m).    Weight as of 2/24/18: 96.6 kg (213 lb).  Physical Exam   Constitutional: He is oriented to person, place, and time. He appears well-developed and well-nourished. No distress.   HENT:   Head: Atraumatic.   Neck: No tracheal deviation present.   Cardiovascular: Normal rate.    Pulmonary/Chest: Effort normal. No respiratory distress. He has no wheezes.   Abdominal: Soft. Bowel sounds are normal. He exhibits no distension and no mass. There is no tenderness. There is no rebound and no guarding.   Neurological: He is alert and oriented to person, place, and time.   Skin: Skin is warm and dry. He is not diaphoretic.     Psychiatric: He has a normal mood and affect. His behavior is normal. Judgment and thought content normal.         Assessment and Plan:           Problem List Items Addressed This Visit     Prostate cancer - Primary          Follow up:   6 months with PSA.    Nickolas Kim        "

## 2019-02-11 ENCOUNTER — LAB VISIT (OUTPATIENT)
Dept: LAB | Facility: HOSPITAL | Age: 64
End: 2019-02-11
Attending: UROLOGY
Payer: MEDICARE

## 2019-02-11 DIAGNOSIS — C61 PROSTATE CANCER: ICD-10-CM

## 2019-02-11 LAB — COMPLEXED PSA SERPL-MCNC: <0.01 NG/ML

## 2019-02-11 PROCEDURE — 84153 ASSAY OF PSA TOTAL: CPT

## 2019-02-11 PROCEDURE — 36415 COLL VENOUS BLD VENIPUNCTURE: CPT

## 2019-02-14 ENCOUNTER — OFFICE VISIT (OUTPATIENT)
Dept: UROLOGY | Facility: CLINIC | Age: 64
End: 2019-02-14
Payer: COMMERCIAL

## 2019-02-14 VITALS
HEART RATE: 62 BPM | DIASTOLIC BLOOD PRESSURE: 84 MMHG | RESPIRATION RATE: 15 BRPM | BODY MASS INDEX: 29.82 KG/M2 | HEIGHT: 71 IN | WEIGHT: 213 LBS | SYSTOLIC BLOOD PRESSURE: 142 MMHG

## 2019-02-14 DIAGNOSIS — C61 PROSTATE CANCER: Primary | ICD-10-CM

## 2019-02-14 PROCEDURE — 3008F BODY MASS INDEX DOCD: CPT | Mod: CPTII,S$GLB,, | Performed by: UROLOGY

## 2019-02-14 PROCEDURE — 3077F PR MOST RECENT SYSTOLIC BLOOD PRESSURE >= 140 MM HG: ICD-10-PCS | Mod: CPTII,S$GLB,, | Performed by: UROLOGY

## 2019-02-14 PROCEDURE — 3008F PR BODY MASS INDEX (BMI) DOCUMENTED: ICD-10-PCS | Mod: CPTII,S$GLB,, | Performed by: UROLOGY

## 2019-02-14 PROCEDURE — 99999 PR PBB SHADOW E&M-EST. PATIENT-LVL III: ICD-10-PCS | Mod: PBBFAC,,, | Performed by: UROLOGY

## 2019-02-14 PROCEDURE — 3077F SYST BP >= 140 MM HG: CPT | Mod: CPTII,S$GLB,, | Performed by: UROLOGY

## 2019-02-14 PROCEDURE — 3079F DIAST BP 80-89 MM HG: CPT | Mod: CPTII,S$GLB,, | Performed by: UROLOGY

## 2019-02-14 PROCEDURE — 3079F PR MOST RECENT DIASTOLIC BLOOD PRESSURE 80-89 MM HG: ICD-10-PCS | Mod: CPTII,S$GLB,, | Performed by: UROLOGY

## 2019-02-14 PROCEDURE — 99999 PR PBB SHADOW E&M-EST. PATIENT-LVL III: CPT | Mod: PBBFAC,,, | Performed by: UROLOGY

## 2019-02-14 PROCEDURE — 99214 OFFICE O/P EST MOD 30 MIN: CPT | Mod: S$GLB,,, | Performed by: UROLOGY

## 2019-02-14 PROCEDURE — 99214 PR OFFICE/OUTPT VISIT, EST, LEVL IV, 30-39 MIN: ICD-10-PCS | Mod: S$GLB,,, | Performed by: UROLOGY

## 2019-02-14 NOTE — PROGRESS NOTES
Clinic Note  2/14/2019      Subjective:         Chief Complaint:   CALEB Knott is a 63 y.o. male S/P robotic assisted laparoscopic prostatectomy on 8/28/2017.Here with his wife Xiomara.  Continent and pad free, doing Kegels. Here with his wife Xiomara.   PSA <0.1.     Path:  Procedure: Radical prostatectomy  Histologic Type: Adenocarcinoma (acinar, not otherwise specified)  Histologic Grade:  [place] Pattern  Primary Pattern: 3  Secondary Pattern: Pattern 3  Tertiary Pattern: Pattern 4  Total [place] Score: 6  Grade Group: Grade group 1  Tumor Quantitation: Tumor size (dominant nodule, if present): 1.9 cm in greatest dimension  Extraprostatic Extension (EPE): Present, nonfocal  Urinary Bladder Neck Invasion: Not identified  Seminal Vesicle Invasion (invasion of muscular wall required): Not identified  Margins: Uninvolved by invasive carcinoma  Benign prostate glands present at surgical margin at the apex  Lymphovascular Invasion: Not identified  Perineural Invasion: Present  Regional Lymph Nodes  Number of Lymph Nodes Involved: 0  Number of Lymph Nodes Examined: 4  Pathologic Stage Classification (pTNM, AJCC 7th Edition): p T3a N0         Lab Results   Component Value Date    PSADIAG <0.01 02/11/2019    PSADIAG <0.01 02/05/2018    PSADIAG <0.01 10/03/2017      Past Medical History:   Diagnosis Date    Allergy     COPD (chronic obstructive pulmonary disease)     Diabetes mellitus     Diabetes mellitus due to abnormal insulin 7/25/2017    Fracture, ribs     Hyperlipemia     Hypertension     Prostate cancer 08/2017    Seizure      Family History   Problem Relation Age of Onset    Hypertension Father     Cancer Mother     Cancer Paternal Grandmother     Prostate cancer Paternal Grandfather     Cancer Maternal Grandmother      Social History     Socioeconomic History    Marital status:      Spouse name: Not on file    Number of children: Not on file    Years of education: Not on file     Highest education level: Not on file   Social Needs    Financial resource strain: Not on file    Food insecurity - worry: Not on file    Food insecurity - inability: Not on file    Transportation needs - medical: Not on file    Transportation needs - non-medical: Not on file   Occupational History    Occupation:    Tobacco Use    Smoking status: Light Tobacco Smoker     Packs/day: 1.50     Types: Vaping with nicotine     Start date:      Last attempt to quit:      Years since quittin.1    Smokeless tobacco: Never Used    Tobacco comment: currently vapes, former cigarette smoker   Substance and Sexual Activity    Alcohol use: No    Drug use: No    Sexual activity: Yes     Partners: Female   Other Topics Concern    Not on file   Social History Narrative    Not on file     Past Surgical History:   Procedure Laterality Date    BACK SURGERY      lamputation of little finger tip  1979    MOUTH SURGERY      ROBOTIC ASSISTED LAPAROSCOPIC PROSTATECTOMY N/A 2017    Performed by [facility] at [facility]    tonsilectomy  1960     Patient Active Problem List   Diagnosis    Prostate cancer    Diabetes mellitus due to abnormal insulin    Hypertension    Psoriasis    Male stress incontinence    Erectile dysfunction following radical prostatectomy    Syncope    STEMI (ST elevation myocardial infarction)    Sleep arousal disorder     Review of Systems   Constitutional: Negative for appetite change, chills, fatigue, fever and unexpected weight change.   HENT: Negative for nosebleeds.    Respiratory: Positive for shortness of breath. Negative for wheezing.    Cardiovascular: Negative for chest pain, palpitations and leg swelling.   Gastrointestinal: Negative for abdominal distention, abdominal pain, constipation, diarrhea, nausea and vomiting.   Genitourinary: Negative for dysuria and hematuria.   Musculoskeletal: Negative for arthralgias and back pain.  "  Skin: Negative for pallor.   Neurological: Negative for dizziness, seizures and syncope.   Hematological: Negative for adenopathy.   Psychiatric/Behavioral: Negative for dysphoric mood.         Objective:      BP (!) 142/84   Pulse 62   Resp 15   Ht 5' 11" (1.803 m)   Wt 96.6 kg (213 lb)   BMI 29.71 kg/m²   Estimated body mass index is 29.71 kg/m² as calculated from the following:    Height as of this encounter: 5' 11" (1.803 m).    Weight as of this encounter: 96.6 kg (213 lb).  Physical Exam   Nursing note and vitals reviewed.  Constitutional: He is oriented to person, place, and time. He appears well-developed and well-nourished. No distress.   HENT:   Head: Normocephalic and atraumatic.   Eyes: Pupils are equal, round, and reactive to light.   Neck: No thyromegaly present.   Cardiovascular: Normal rate.    Pulmonary/Chest: Effort normal. He has no wheezes.   Abdominal: Soft. He exhibits no distension and no mass. There is no tenderness. There is no guarding.   Genitourinary: Testes normal and penis normal. Right testis shows no mass. Left testis shows no mass.   Lymphadenopathy:     He has no cervical adenopathy.   Neurological: He is alert and oriented to person, place, and time.   Skin: Skin is warm and dry.     Psychiatric: He has a normal mood and affect. His behavior is normal. Judgment and thought content normal.         Assessment and Plan:           Problem List Items Addressed This Visit     Prostate cancer - Primary      6 months with PSA.    Follow up:   6 months with PSA.    Nickolas Kim"

## 2019-08-15 ENCOUNTER — OFFICE VISIT (OUTPATIENT)
Dept: UROLOGY | Facility: CLINIC | Age: 64
End: 2019-08-15
Payer: MEDICARE

## 2019-08-15 VITALS — SYSTOLIC BLOOD PRESSURE: 154 MMHG | DIASTOLIC BLOOD PRESSURE: 84 MMHG

## 2019-08-15 DIAGNOSIS — E13.9 DIABETES MELLITUS DUE TO ABNORMAL INSULIN: ICD-10-CM

## 2019-08-15 DIAGNOSIS — C61 PROSTATE CANCER: Primary | ICD-10-CM

## 2019-08-15 PROCEDURE — 99999 PR PBB SHADOW E&M-EST. PATIENT-LVL II: ICD-10-PCS | Mod: PBBFAC,,, | Performed by: UROLOGY

## 2019-08-15 PROCEDURE — 99214 OFFICE O/P EST MOD 30 MIN: CPT | Mod: S$PBB,,, | Performed by: UROLOGY

## 2019-08-15 PROCEDURE — 99212 OFFICE O/P EST SF 10 MIN: CPT | Mod: PBBFAC | Performed by: UROLOGY

## 2019-08-15 PROCEDURE — 99214 PR OFFICE/OUTPT VISIT, EST, LEVL IV, 30-39 MIN: ICD-10-PCS | Mod: S$PBB,,, | Performed by: UROLOGY

## 2019-08-15 PROCEDURE — 99999 PR PBB SHADOW E&M-EST. PATIENT-LVL II: CPT | Mod: PBBFAC,,, | Performed by: UROLOGY

## 2019-08-15 NOTE — LETTER
August 15, 2019        No Recipients             [facility]  [address]  [facility] [address]  Phone: [phone]   Patient: Bert Knott   MR Number: [id number]   YOB: 1955   Date of Visit: 8/15/2019       Dear Dr. Novak Recipients:    Thank you for referring Bert Knott to me for evaluation. Attached you will find relevant portions of my assessment and plan of care.    If you have questions, please do not hesitate to call me. I look forward to following Bert Knott along with you.    Sincerely,      Nickolas Kim MD            CC  No Recipients    Enclosure

## 2019-08-15 NOTE — LETTER
August 15, 2019        Delfin Song MD  [address] [facility] [address]             [facility]  [address]  [place] [address]  Phone: [phone]   Patient: Bert Knott   MR Number: [id number]   YOB: 1955   Date of Visit: 8/15/2019       Dear Dr. Song:    Thank you for referring Bert Knott to me for evaluation. Attached you will find relevant portions of my assessment and plan of care.    If you have questions, please do not hesitate to call me. I look forward to following Bert Knott along with you.    Sincerely,      Nickolas Kim MD            CC  No Recipients    Enclosure

## 2019-08-15 NOTE — PROGRESS NOTES
Clinic Note  8/15/2019      Subjective:         Chief Complaint:   CALEB Knott is a 64 y.o. male S/P robotic assisted laparoscopic prostatectomy on 8/28/2017.Here with his wife Xiomara.  Continent and pad free, doing Kegels. Here with his wife Xiomara.   PSA <0.1. Potency improving.     Path:  Procedure: Radical prostatectomy  Histologic Type: Adenocarcinoma (acinar, not otherwise specified)  Histologic Grade:  [place] Pattern  Primary Pattern: 3  Secondary Pattern: Pattern 3  Tertiary Pattern: Pattern 4  Total [place] Score: 6  Grade Group: Grade group 1  Tumor Quantitation: Tumor size (dominant nodule, if present): 1.9 cm in greatest dimension  Extraprostatic Extension (EPE): Present, nonfocal  Urinary Bladder Neck Invasion: Not identified  Seminal Vesicle Invasion (invasion of muscular wall required): Not identified  Margins: Uninvolved by invasive carcinoma  Benign prostate glands present at surgical margin at the apex  Lymphovascular Invasion: Not identified  Perineural Invasion: Present  Regional Lymph Nodes  Number of Lymph Nodes Involved: 0  Number of Lymph Nodes Examined: 4  Pathologic Stage Classification (pTNM, AJCC 7th Edition): p T3a N0            Lab Results   Component Value Date    PSADIAG <0.01 02/11/2019    PSADIAG <0.01 02/05/2018    PSADIAG <0.01 10/03/2017      Past Medical History:   Diagnosis Date    Allergy     COPD (chronic obstructive pulmonary disease)     Diabetes mellitus     Diabetes mellitus due to abnormal insulin 7/25/2017    Fracture, ribs     Hyperlipemia     Hypertension     Prostate cancer 08/2017    Seizure      Family History   Problem Relation Age of Onset    Hypertension Father     Cancer Mother     Cancer Paternal Grandmother     Prostate cancer Paternal Grandfather     Cancer Maternal Grandmother      Social History     Socioeconomic History    Marital status:      Spouse name: Not on file    Number of children: Not on file    Years of  education: Not on file    Highest education level: Not on file   Occupational History    Occupation:    Social Needs    Financial resource strain: Not on file    Food insecurity:     Worry: Not on file     Inability: Not on file    Transportation needs:     Medical: Not on file     Non-medical: Not on file   Tobacco Use    Smoking status: Light Tobacco Smoker     Packs/day: 1.50     Types: Vaping with nicotine     Start date:      Last attempt to quit:      Years since quittin.6    Smokeless tobacco: Never Used    Tobacco comment: currently vapes, former cigarette smoker   Substance and Sexual Activity    Alcohol use: No    Drug use: No    Sexual activity: Yes     Partners: Female   Lifestyle    Physical activity:     Days per week: Not on file     Minutes per session: Not on file    Stress: Not on file   Relationships    Social connections:     Talks on phone: Not on file     Gets together: Not on file     Attends Buddhist service: Not on file     Active member of club or organization: Not on file     Attends meetings of clubs or organizations: Not on file     Relationship status: Not on file   Other Topics Concern    Not on file   Social History Narrative    Not on file     Past Surgical History:   Procedure Laterality Date    BACK SURGERY  1974    lamputation of little finger tip  1979    MOUTH SURGERY  1972    ROBOTIC ASSISTED LAPAROSCOPIC PROSTATECTOMY N/A 2017    [facility]formed by [facility] at [facility] OR [address] [place] [address]    tonsilectomy  1960     Patient Active Problem List   Diagnosis    Prostate cancer    Diabetes mellitus due to abnormal insulin    Hypertension    Psoriasis    Male stress incontinence    Erectile dysfunction following radical prostatectomy    Syncope    STEMI (ST elevation myocardial infarction)    Sleep arousal disorder     Review of Systems   Constitutional: Negative for appetite change, chills, fatigue, fever and unexpected weight  "change.   HENT: Negative for nosebleeds.    Respiratory: Negative for shortness of breath and wheezing.    Cardiovascular: Negative for chest pain, palpitations and leg swelling.   Gastrointestinal: Negative for abdominal distention, abdominal pain, constipation, diarrhea, nausea and vomiting.   Musculoskeletal: Negative for arthralgias and back pain.   Skin: Negative for pallor.   Neurological: Negative for dizziness, seizures and syncope.   Hematological: Negative for adenopathy.   Psychiatric/Behavioral: Negative for dysphoric mood.         Objective:      BP (!) 154/84   Estimated body mass index is 29.71 kg/m² as calculated from the following:    Height as of 2/14/19: 5' 11" (1.803 m).    Weight as of 2/14/19: 96.6 kg (213 lb).  Physical Exam   Constitutional: He is oriented to person, place, and time. He appears well-developed and well-nourished. No distress.   HENT:   Head: Atraumatic.   Neck: No tracheal deviation present.   Cardiovascular: Normal rate.    Pulmonary/Chest: Effort normal. No respiratory distress. He has no wheezes.   Abdominal: Soft. Bowel sounds are normal. He exhibits no distension and no mass. There is no tenderness. There is no rebound and no guarding.   Neurological: He is alert and oriented to person, place, and time.   Skin: Skin is warm and dry. He is not diaphoretic.     Psychiatric: He has a normal mood and affect. His behavior is normal. Judgment and thought content normal.         Assessment and Plan:           Problem List Items Addressed This Visit     Prostate cancer - Primary    Diabetes mellitus due to abnormal insulin          Follow up:     Poorly controlled diabetes. HgA1C 8.3.  Discussed that this would negatively impact erection return as well as his overall health and longevity.  6 months with PSA.  Letter to Dr. Song.  Nickolas Kim        "

## 2019-11-21 PROBLEM — Z86.0100 HISTORY OF COLON POLYPS: Status: ACTIVE | Noted: 2019-11-21

## 2019-11-21 PROBLEM — Z86.010 HISTORY OF COLON POLYPS: Status: ACTIVE | Noted: 2019-11-21

## 2020-02-20 ENCOUNTER — OFFICE VISIT (OUTPATIENT)
Dept: UROLOGY | Facility: CLINIC | Age: 65
End: 2020-02-20
Payer: MEDICARE

## 2020-02-20 VITALS
DIASTOLIC BLOOD PRESSURE: 70 MMHG | HEIGHT: 71 IN | BODY MASS INDEX: 30.56 KG/M2 | SYSTOLIC BLOOD PRESSURE: 120 MMHG | WEIGHT: 218.25 LBS

## 2020-02-20 DIAGNOSIS — Z90.79 HISTORY OF ROBOT-ASSISTED LAPAROSCOPIC RADICAL PROSTATECTOMY: ICD-10-CM

## 2020-02-20 DIAGNOSIS — N52.31 ERECTILE DYSFUNCTION FOLLOWING RADICAL PROSTATECTOMY: ICD-10-CM

## 2020-02-20 DIAGNOSIS — C61 PROSTATE CANCER: Primary | ICD-10-CM

## 2020-02-20 DIAGNOSIS — R97.21 RISING PSA FOLLOWING TREATMENT FOR MALIGNANT NEOPLASM OF PROSTATE: ICD-10-CM

## 2020-02-20 PROCEDURE — 99214 OFFICE O/P EST MOD 30 MIN: CPT | Mod: PBBFAC | Performed by: NURSE PRACTITIONER

## 2020-02-20 PROCEDURE — 99214 OFFICE O/P EST MOD 30 MIN: CPT | Mod: S$PBB,,, | Performed by: NURSE PRACTITIONER

## 2020-02-20 PROCEDURE — 99999 PR PBB SHADOW E&M-EST. PATIENT-LVL IV: CPT | Mod: PBBFAC,,, | Performed by: NURSE PRACTITIONER

## 2020-02-20 PROCEDURE — 99999 PR PBB SHADOW E&M-EST. PATIENT-LVL IV: ICD-10-PCS | Mod: PBBFAC,,, | Performed by: NURSE PRACTITIONER

## 2020-02-20 PROCEDURE — 99214 PR OFFICE/OUTPT VISIT, EST, LEVL IV, 30-39 MIN: ICD-10-PCS | Mod: S$PBB,,, | Performed by: NURSE PRACTITIONER

## 2020-02-20 RX ORDER — TADALAFIL 20 MG/1
20 TABLET ORAL DAILY
Qty: 10 TABLET | Refills: 12 | Status: SHIPPED | OUTPATIENT
Start: 2020-02-20 | End: 2020-11-23

## 2020-02-20 NOTE — PROGRESS NOTES
Subjective:       Patient ID: Bert Knott is a 65 y.o. male.    Chief Complaint: Prostate Cancer and Follow-up (6 month)    Bert Knott is a 65 y.o. Male with a history of Prostate Cancer; s/p RALP with Dr. Kim 08/28/2017  [place] 6 with (+) extension. (-) margins and SV; pT3, pN0, pMx    Last clinic visit was with Dr. Kim on 08/15/2019.                               PSA                  <0.06               02/18/2020       PSA                  <0.10               02/06/2019     (outside)       PSA                  <0.01               02/11/2019         PSA                  <0.10               08/14/2018     (outside)       PSA                  <0.01               02/05/2018                 PSA                  <0.01               10/03/2017                Review of Systems   Constitutional: Negative for activity change, appetite change, chills and fever.   HENT: Negative for facial swelling and trouble swallowing.    Eyes: Negative for visual disturbance.   Respiratory: Negative for chest tightness and shortness of breath.    Cardiovascular: Negative for chest pain and palpitations.   Gastrointestinal: Negative.  Negative for abdominal pain, constipation, diarrhea, nausea and vomiting.   Genitourinary: Positive for urgency. Negative for difficulty urinating, dysuria, flank pain, hematuria, penile pain, penile swelling, scrotal swelling and testicular pain.        Ok with how he urinates  Minimal DHRUV  Not wearing pads     Musculoskeletal: Negative for back pain, gait problem, myalgias and neck stiffness.   Skin: Negative for rash.   Neurological: Negative for dizziness and speech difficulty.   Hematological: Does not bruise/bleed easily.   Psychiatric/Behavioral: Negative for behavioral problems.       Objective:      Physical Exam   Nursing note and vitals reviewed.  Constitutional: He is oriented to person, place, and time. Vital signs are normal. He appears well-developed and well-nourished.  He is active and cooperative.  Non-toxic appearance. He does not have a sickly appearance.   HENT:   Head: Normocephalic and atraumatic.   Right Ear: External ear normal.   Left Ear: External ear normal.   Nose: Nose normal.   Mouth/Throat: Mucous membranes are normal.   Eyes: Conjunctivae and lids are normal. No scleral icterus.   Neck: Trachea normal, normal range of motion and full passive range of motion without pain. Neck supple. No JVD present. No tracheal deviation present.   Cardiovascular: Normal rate, S1 normal and S2 normal.    Pulmonary/Chest: Effort normal. No respiratory distress. He exhibits no tenderness.   Abdominal: Soft. Normal appearance. There is no hepatosplenomegaly. There is no tenderness. There is no CVA tenderness.   Genitourinary: Testes normal and penis normal.       Musculoskeletal: Normal range of motion.   Neurological: He is alert and oriented to person, place, and time. He has normal strength.   Skin: Skin is warm, dry and intact.     Psychiatric: He has a normal mood and affect. His behavior is normal. Judgment and thought content normal.       Assessment:       1. Prostate cancer    2. History of robot-assisted laparoscopic radical prostatectomy    3. Rising PSA following treatment for malignant neoplasm of prostate    4. Erectile dysfunction following radical prostatectomy        Plan:         I spent 30 minutes with the patient of which more than half was spent in direct consultation with the patient in regards to our treatment and plan.    Education and recommendations of today's plan of care including home remedies.  We discussed his PSA levels. Last result was <0.06; discussed this does not mean an increase but this is as low as this lab calibrates their machine.  Reassurance given. (called him to clarify this).   We discussed next steps in case of rise  We discussed his ED and contributory factors.  He had tried Cialis in past; discussed effectiveness of Cialis immediately  post op and right now  Rx for trial of cialis 20mg sent to [facility];   PSA in 6 months

## 2020-02-20 NOTE — PATIENT INSTRUCTIONS
PSA                  <0.06               02/18/2020       PSA                  <0.10               02/06/2019     (outside)       PSA                  <0.01               02/11/2019         PSA                  <0.10               08/14/2018     (outside)       PSA                  <0.01               02/05/2018                 PSA                  <0.01               10/03/2017                             What Is Prostate Cancer?    Cancer is when cells in the body change and grow out of control. Cancer cells can form lumps of tissue called tumors. Cancer that starts in the prostate is called prostate cancer. It can grow and spread beyond the prostate. Cancer that spreads is harder to treat.  Understanding the prostate  The prostate is a gland in men about the size and shape of a walnut. It surrounds the upper part of the urethra. This is the tube that carries urine from the bladder. The prostate makes some of the fluid thats part of semen. During orgasm, semen leaves the body through the urethra.  When prostate cancer forms  As a man ages, the cells of his prostate may change to form tumors or other growths. The types of growths include:  · Noncancerous growths. As a man ages, the prostate may grow larger. This is called benign prostatic hyperplasia (BPH). With BPH, extra prostate tissue often squeezes the urethra, causing symptoms such as trouble urinating. But BPH is not cancer and does not lead to cancer.  · Atypical cells. Sometimes prostate cells dont look like normal (typical) prostate cells. One type of abnormal growth is called prostatic intraepithelial neoplasia or PIN. Although PIN cells are not cancer cells, they may be a sign that cancer is likely to form.  · Cancer. When abnormal prostate cells grow out of control and start to invade other tissues, they are called cancer cells. These cells may or may not lead to symptoms. Some tumors can be felt during a physical exam, and  some cant. Prostate cancer may grow into nearby organs or spread to nearby lymph nodes. Lymph nodes are small organs around the body that are part of the immune system. In some cases, the cancer spreads to bones or organs in distant parts of the body. This is called metastasis.  Diagnosing prostate cancer  Prostate cancer may not cause symptoms at first. Urinary problems are often not a sign of cancer, but of another condition, such as BPH. To find out if you have prostate cancer, your healthcare provider must examine you and order tests. The tests help confirm a diagnosis of cancer. They also help give more information about a cancerous tumor. Tests might include:  · Prostate specific antigen (PSA) testing. PSA is a chemical made by prostate tissue. The amount of PSA in the blood (PSA level) is tested to check a mans risk for prostate cancer. In general, a high or rising PSA level may mean an increased cancer risk. A PSA test by itself cannot show if a man has prostate cancer. PSA testing is also used to check the success of cancer treatments.  · Core needle biopsy. This test is done to determine if a man has prostate cancer. A hollow needle is used to take small pieces of tissue from the prostate. This helps give more information about the cells. Before the test, pain medicine may be given to prevent pain. During the test, a small probe is inserted into the rectum. The probe sends an image of the prostate to a video monitor. With this image as a guide, the healthcare provider uses a thin, hollow needle to remove tiny tissue samples from the prostate. These are sent to a lab where they are looked at for cancer cells.  Date Last Reviewed: 5/1/2017 © 2000-2017 [employer]. [address] [place] [address], [place] [address]. All rights reserved. This information is not intended as a substitute for professional medical care. Always follow your healthcare professional's instructions.

## 2020-11-23 PROBLEM — S68.119A: Status: ACTIVE | Noted: 2020-11-23

## 2021-11-23 DIAGNOSIS — R19.00 PELVIC MASS: Primary | ICD-10-CM

## 2021-11-24 ENCOUNTER — TELEPHONE (OUTPATIENT)
Dept: ORTHOPEDICS | Facility: CLINIC | Age: 66
End: 2021-11-24
Payer: MEDICARE

## 2021-11-24 ENCOUNTER — TELEPHONE (OUTPATIENT)
Dept: HEMATOLOGY/ONCOLOGY | Facility: CLINIC | Age: 66
End: 2021-11-24
Payer: MEDICARE

## 2021-11-24 DIAGNOSIS — R19.00 PELVIC MASS: Primary | ICD-10-CM

## 2021-11-30 ENCOUNTER — OFFICE VISIT (OUTPATIENT)
Dept: ORTHOPEDICS | Facility: CLINIC | Age: 66
End: 2021-11-30
Payer: MEDICARE

## 2021-11-30 VITALS — WEIGHT: 209.38 LBS | BODY MASS INDEX: 29.97 KG/M2 | HEIGHT: 70 IN

## 2021-11-30 DIAGNOSIS — T14.8XXA CONTUSION OF BONE: Primary | ICD-10-CM

## 2021-11-30 PROCEDURE — 99999 PR PBB SHADOW E&M-EST. PATIENT-LVL III: CPT | Mod: PBBFAC,,, | Performed by: ORTHOPAEDIC SURGERY

## 2021-11-30 PROCEDURE — 99203 OFFICE O/P NEW LOW 30 MIN: CPT | Mod: S$GLB,,, | Performed by: ORTHOPAEDIC SURGERY

## 2021-11-30 PROCEDURE — 99999 PR PBB SHADOW E&M-EST. PATIENT-LVL III: ICD-10-PCS | Mod: PBBFAC,,, | Performed by: ORTHOPAEDIC SURGERY

## 2021-11-30 PROCEDURE — 99203 PR OFFICE/OUTPT VISIT, NEW, LEVL III, 30-44 MIN: ICD-10-PCS | Mod: S$GLB,,, | Performed by: ORTHOPAEDIC SURGERY

## 2022-03-24 ENCOUNTER — OFFICE VISIT (OUTPATIENT)
Dept: UROLOGY | Facility: CLINIC | Age: 67
End: 2022-03-24
Payer: MEDICARE

## 2022-03-24 VITALS
HEART RATE: 42 BPM | DIASTOLIC BLOOD PRESSURE: 83 MMHG | WEIGHT: 202 LBS | SYSTOLIC BLOOD PRESSURE: 167 MMHG | BODY MASS INDEX: 28.28 KG/M2 | HEIGHT: 71 IN

## 2022-03-24 DIAGNOSIS — C61 PROSTATE CANCER: Primary | ICD-10-CM

## 2022-03-24 PROCEDURE — 3288F PR FALLS RISK ASSESSMENT DOCUMENTED: ICD-10-PCS | Mod: CPTII,S$GLB,, | Performed by: UROLOGY

## 2022-03-24 PROCEDURE — 1159F PR MEDICATION LIST DOCUMENTED IN MEDICAL RECORD: ICD-10-PCS | Mod: CPTII,S$GLB,, | Performed by: UROLOGY

## 2022-03-24 PROCEDURE — 3077F SYST BP >= 140 MM HG: CPT | Mod: CPTII,S$GLB,, | Performed by: UROLOGY

## 2022-03-24 PROCEDURE — 1101F PR PT FALLS ASSESS DOC 0-1 FALLS W/OUT INJ PAST YR: ICD-10-PCS | Mod: CPTII,S$GLB,, | Performed by: UROLOGY

## 2022-03-24 PROCEDURE — 3051F HG A1C>EQUAL 7.0%<8.0%: CPT | Mod: CPTII,S$GLB,, | Performed by: UROLOGY

## 2022-03-24 PROCEDURE — 1160F PR REVIEW ALL MEDS BY PRESCRIBER/CLIN PHARMACIST DOCUMENTED: ICD-10-PCS | Mod: CPTII,S$GLB,, | Performed by: UROLOGY

## 2022-03-24 PROCEDURE — 99213 PR OFFICE/OUTPT VISIT, EST, LEVL III, 20-29 MIN: ICD-10-PCS | Mod: S$GLB,,, | Performed by: UROLOGY

## 2022-03-24 PROCEDURE — 3077F PR MOST RECENT SYSTOLIC BLOOD PRESSURE >= 140 MM HG: ICD-10-PCS | Mod: CPTII,S$GLB,, | Performed by: UROLOGY

## 2022-03-24 PROCEDURE — 3079F PR MOST RECENT DIASTOLIC BLOOD PRESSURE 80-89 MM HG: ICD-10-PCS | Mod: CPTII,S$GLB,, | Performed by: UROLOGY

## 2022-03-24 PROCEDURE — 3008F PR BODY MASS INDEX (BMI) DOCUMENTED: ICD-10-PCS | Mod: CPTII,S$GLB,, | Performed by: UROLOGY

## 2022-03-24 PROCEDURE — 3008F BODY MASS INDEX DOCD: CPT | Mod: CPTII,S$GLB,, | Performed by: UROLOGY

## 2022-03-24 PROCEDURE — 99999 PR PBB SHADOW E&M-EST. PATIENT-LVL III: CPT | Mod: PBBFAC,,, | Performed by: UROLOGY

## 2022-03-24 PROCEDURE — 1160F RVW MEDS BY RX/DR IN RCRD: CPT | Mod: CPTII,S$GLB,, | Performed by: UROLOGY

## 2022-03-24 PROCEDURE — 99213 OFFICE O/P EST LOW 20 MIN: CPT | Mod: S$GLB,,, | Performed by: UROLOGY

## 2022-03-24 PROCEDURE — 3079F DIAST BP 80-89 MM HG: CPT | Mod: CPTII,S$GLB,, | Performed by: UROLOGY

## 2022-03-24 PROCEDURE — 1159F MED LIST DOCD IN RCRD: CPT | Mod: CPTII,S$GLB,, | Performed by: UROLOGY

## 2022-03-24 PROCEDURE — 3051F PR MOST RECENT HEMOGLOBIN A1C LEVEL 7.0 - < 8.0%: ICD-10-PCS | Mod: CPTII,S$GLB,, | Performed by: UROLOGY

## 2022-03-24 PROCEDURE — 99999 PR PBB SHADOW E&M-EST. PATIENT-LVL III: ICD-10-PCS | Mod: PBBFAC,,, | Performed by: UROLOGY

## 2022-03-24 PROCEDURE — 1126F AMNT PAIN NOTED NONE PRSNT: CPT | Mod: CPTII,S$GLB,, | Performed by: UROLOGY

## 2022-03-24 PROCEDURE — 1101F PT FALLS ASSESS-DOCD LE1/YR: CPT | Mod: CPTII,S$GLB,, | Performed by: UROLOGY

## 2022-03-24 PROCEDURE — 1126F PR PAIN SEVERITY QUANTIFIED, NO PAIN PRESENT: ICD-10-PCS | Mod: CPTII,S$GLB,, | Performed by: UROLOGY

## 2022-03-24 PROCEDURE — 3288F FALL RISK ASSESSMENT DOCD: CPT | Mod: CPTII,S$GLB,, | Performed by: UROLOGY

## 2022-03-24 NOTE — PROGRESS NOTES
Clinic Note  3/24/2022      Subjective:         Chief Complaint:   HPI  Bert Knott is a 67 y.o. male S/P robotic assisted laparoscopic prostatectomy on 8/28/2017.Here with his wife Xiomara.  Continent and pad free, doing Kegels. Here with his wife Xiomara. Retired since 2017.      Path:  Procedure: Radical prostatectomy  Histologic Type: Adenocarcinoma (acinar, not otherwise specified)  Histologic Grade:  Hira Pattern  Primary Pattern: 3  Secondary Pattern: Pattern 3  Tertiary Pattern: Pattern 4  Total Hira Score: 6  Grade Group: Grade group 1  Tumor Quantitation: Tumor size (dominant nodule, if present): 1.9 cm in greatest dimension  Extraprostatic Extension (EPE): Present, nonfocal  Urinary Bladder Neck Invasion: Not identified  Seminal Vesicle Invasion (invasion of muscular wall required): Not identified  Margins: Uninvolved by invasive carcinoma  Benign prostate glands present at surgical margin at the apex  Lymphovascular Invasion: Not identified  Perineural Invasion: Present  Regional Lymph Nodes  Number of Lymph Nodes Involved: 0  Number of Lymph Nodes Examined: 4  Pathologic Stage Classification (pTNM, AJCC 7th Edition): p T3a N0    3/24/2022- PSA 0.13. Discussed BCR. Discussed PSMA scan, EBRT, surveillance.  Last visit here 2/2/2020. Continent      Lab Results   Component Value Date    PSADIAG 0.13 03/21/2022    PSADIAG 0.07 08/23/2021    PSADIAG 0.07 02/15/2021    PSADIAG <0.06 08/24/2020    PSADIAG <0.06 08/24/2020    PSADIAG <0.06 02/18/2020    PSADIAG <0.01 02/11/2019    PSADIAG <0.01 02/05/2018    PSADIAG <0.01 10/03/2017      Past Medical History:   Diagnosis Date    Allergy     Colon polyps     COPD (chronic obstructive pulmonary disease)     Diabetes mellitus     Diabetes mellitus due to abnormal insulin 7/25/2017    Diverticulosis     Fracture, ribs     Hypercholesterolemia     Hyperlipemia     Hypertension     Prostate cancer 08/2017    Seizure      Family History    Problem Relation Age of Onset    Hypertension Father     Cancer Mother     Cancer Paternal Grandmother     Prostate cancer Paternal Grandfather     Cancer Maternal Grandmother      Social History     Socioeconomic History    Marital status:    Occupational History    Occupation:    Tobacco Use    Smoking status: Light Tobacco Smoker     Packs/day: 1.50     Types: Vaping with nicotine     Start date:      Last attempt to quit: 2014     Years since quittin.2    Smokeless tobacco: Never Used    Tobacco comment: currently vapes, former cigarette smoker   Substance and Sexual Activity    Alcohol use: No    Drug use: No    Sexual activity: Yes     Partners: Female     Past Surgical History:   Procedure Laterality Date    BACK SURGERY      COLONOSCOPY N/A 2019    Procedure: HIGH RISK SCREENING COLONOSCOPY;  Surgeon: Kaiser Soler MD;  Location: [facility] ENDO;  Service: Endoscopy;  Laterality: N/A;    COLONOSCOPY W/ POLYPECTOMY      FINGER AMPUTATION Left 2020    Procedure: AMPUTATION, FINGER - Left index finger completion of amputation at the level of the PIP joint MIDDLE FINGER PIP UCL REPAIR ;  Surgeon: Kaden Wilkins MD;  Location: [facility] OR;  Service: Orthopedics;  Laterality: Left;  Covid Neg     lamputation of little finger tip  1979    MOUTH SURGERY      OPEN REDUCTION AND INTERNAL FIXATION (ORIF) OF INJURY OF FINGER Left 2020    Procedure: ORIF, FINGER RING- left ring finger;  Surgeon: Kaden Wilkins MD;  Location: [facility] OR;  Service: Orthopedics;  Laterality: Left;    PROSTATECTOMY      REPAIR OF LIGAMENT Left 2020    Procedure: REPAIR, LIGAMENT - Left middle finger ulnar collateral ligament repair of the PIP joint and transarticular pinning;  Surgeon: Kaden Wilkins MD;  Location: [facility] OR;  Service: Orthopedics;  Laterality: Left;    REPAIR OF NERVE OF FINGER Left 2020    Procedure: REPAIR, NERVE, FINGER -  Left  "middle finger ulnar digital nerve repair with conduit;  Surgeon: Kaden Wilkins MD;  Location: [facility] OR;  Service: Orthopedics;  Laterality: Left;    tonsilectomy  1960    TONSILLECTOMY       Patient Active Problem List   Diagnosis    Prostate cancer    Diabetes mellitus due to abnormal insulin    Hypertension    Psoriasis    Male stress incontinence    Erectile dysfunction following radical prostatectomy    Syncope    STEMI (ST elevation myocardial infarction)    Sleep arousal disorder    History of colon polyps    Traumatic amputation of digit of left hand    Diabetes mellitus    COPD (chronic obstructive pulmonary disease)    Amputation of finger of left hand including thumb     Review of Systems   Constitutional: Negative for appetite change, chills, fatigue, fever and unexpected weight change.   HENT: Negative for nosebleeds.    Respiratory: Negative for shortness of breath and wheezing.    Cardiovascular: Negative for chest pain, palpitations and leg swelling.   Gastrointestinal: Negative for abdominal distention, abdominal pain, constipation, diarrhea, nausea and vomiting.   Genitourinary: Negative for dysuria and hematuria.   Musculoskeletal: Negative for arthralgias and back pain.   Skin: Negative for pallor.   Neurological: Negative for dizziness, seizures and syncope.   Hematological: Negative for adenopathy.   Psychiatric/Behavioral: Negative for dysphoric mood.         Objective:      There were no vitals taken for this visit.  Estimated body mass index is 30.05 kg/m² as calculated from the following:    Height as of 11/30/21: 5' 10" (1.778 m).    Weight as of 11/30/21: 95 kg (209 lb 6.4 oz).  Physical Exam  Constitutional:       General: He is not in acute distress.     Appearance: He is well-developed. He is not diaphoretic.   HENT:      Head: Atraumatic.   Neck:      Trachea: No tracheal deviation.   Cardiovascular:      Rate and Rhythm: Normal rate.   Pulmonary:      Effort: " Pulmonary effort is normal. No respiratory distress.      Breath sounds: No wheezing.   Abdominal:      General: Bowel sounds are normal. There is no distension.      Palpations: Abdomen is soft. There is no mass.      Tenderness: There is no abdominal tenderness. There is no guarding or rebound.   Skin:     General: Skin is warm and dry.   Neurological:      Mental Status: He is alert and oriented to person, place, and time.   Psychiatric:         Behavior: Behavior normal.         Thought Content: Thought content normal.         Judgment: Judgment normal.           Assessment and Plan:           Problem List Items Addressed This Visit    None         Follow up:   Long discussion. Wants to pursue close surveillance for now.  6 months with PSA.    Nickolas Kim

## 2022-09-20 ENCOUNTER — TELEPHONE (OUTPATIENT)
Dept: UROLOGY | Facility: CLINIC | Age: 67
End: 2022-09-20
Payer: MEDICARE

## 2022-10-13 ENCOUNTER — OFFICE VISIT (OUTPATIENT)
Dept: UROLOGY | Facility: CLINIC | Age: 67
End: 2022-10-13
Payer: MEDICARE

## 2022-10-13 VITALS
WEIGHT: 205.56 LBS | HEART RATE: 59 BPM | DIASTOLIC BLOOD PRESSURE: 74 MMHG | HEIGHT: 71 IN | BODY MASS INDEX: 28.78 KG/M2 | SYSTOLIC BLOOD PRESSURE: 148 MMHG

## 2022-10-13 DIAGNOSIS — C61 PROSTATE CANCER: Primary | ICD-10-CM

## 2022-10-13 PROCEDURE — 3288F PR FALLS RISK ASSESSMENT DOCUMENTED: ICD-10-PCS | Mod: CPTII,S$GLB,, | Performed by: UROLOGY

## 2022-10-13 PROCEDURE — 99214 PR OFFICE/OUTPT VISIT, EST, LEVL IV, 30-39 MIN: ICD-10-PCS | Mod: S$GLB,,, | Performed by: UROLOGY

## 2022-10-13 PROCEDURE — 1101F PT FALLS ASSESS-DOCD LE1/YR: CPT | Mod: CPTII,S$GLB,, | Performed by: UROLOGY

## 2022-10-13 PROCEDURE — 3078F PR MOST RECENT DIASTOLIC BLOOD PRESSURE < 80 MM HG: ICD-10-PCS | Mod: CPTII,S$GLB,, | Performed by: UROLOGY

## 2022-10-13 PROCEDURE — 3288F FALL RISK ASSESSMENT DOCD: CPT | Mod: CPTII,S$GLB,, | Performed by: UROLOGY

## 2022-10-13 PROCEDURE — 3051F HG A1C>EQUAL 7.0%<8.0%: CPT | Mod: CPTII,S$GLB,, | Performed by: UROLOGY

## 2022-10-13 PROCEDURE — 1159F PR MEDICATION LIST DOCUMENTED IN MEDICAL RECORD: ICD-10-PCS | Mod: CPTII,S$GLB,, | Performed by: UROLOGY

## 2022-10-13 PROCEDURE — 1101F PR PT FALLS ASSESS DOC 0-1 FALLS W/OUT INJ PAST YR: ICD-10-PCS | Mod: CPTII,S$GLB,, | Performed by: UROLOGY

## 2022-10-13 PROCEDURE — 1126F AMNT PAIN NOTED NONE PRSNT: CPT | Mod: CPTII,S$GLB,, | Performed by: UROLOGY

## 2022-10-13 PROCEDURE — 3078F DIAST BP <80 MM HG: CPT | Mod: CPTII,S$GLB,, | Performed by: UROLOGY

## 2022-10-13 PROCEDURE — 3077F PR MOST RECENT SYSTOLIC BLOOD PRESSURE >= 140 MM HG: ICD-10-PCS | Mod: CPTII,S$GLB,, | Performed by: UROLOGY

## 2022-10-13 PROCEDURE — 3051F PR MOST RECENT HEMOGLOBIN A1C LEVEL 7.0 - < 8.0%: ICD-10-PCS | Mod: CPTII,S$GLB,, | Performed by: UROLOGY

## 2022-10-13 PROCEDURE — 99214 OFFICE O/P EST MOD 30 MIN: CPT | Mod: S$GLB,,, | Performed by: UROLOGY

## 2022-10-13 PROCEDURE — 3077F SYST BP >= 140 MM HG: CPT | Mod: CPTII,S$GLB,, | Performed by: UROLOGY

## 2022-10-13 PROCEDURE — 99999 PR PBB SHADOW E&M-EST. PATIENT-LVL III: CPT | Mod: PBBFAC,,, | Performed by: UROLOGY

## 2022-10-13 PROCEDURE — 1159F MED LIST DOCD IN RCRD: CPT | Mod: CPTII,S$GLB,, | Performed by: UROLOGY

## 2022-10-13 PROCEDURE — 1126F PR PAIN SEVERITY QUANTIFIED, NO PAIN PRESENT: ICD-10-PCS | Mod: CPTII,S$GLB,, | Performed by: UROLOGY

## 2022-10-13 PROCEDURE — 99999 PR PBB SHADOW E&M-EST. PATIENT-LVL III: ICD-10-PCS | Mod: PBBFAC,,, | Performed by: UROLOGY

## 2022-10-13 NOTE — PROGRESS NOTES
Clinic Note  10/12/2022      Subjective:         Chief Complaint:   CALEB Knott is a 67 y.o. male s/p robotic assisted laparoscopic prostatectomy on 8/28/2017.Here with his wife Xiomara.  Continent and pad free, doing Kegels. Here with his wife Xiomara. Retired since 2017.      Path:  Procedure: Radical prostatectomy  Histologic Type: Adenocarcinoma (acinar, not otherwise specified)  Histologic Grade:  Danese Pattern  Primary Pattern: 3  Secondary Pattern: Pattern 3  Tertiary Pattern: Pattern 4  Total Hira Score: 6  Grade Group: Grade group 1  Tumor Quantitation: Tumor size (dominant nodule, if present): 1.9 cm in greatest dimension  Extraprostatic Extension (EPE): Present, nonfocal  Urinary Bladder Neck Invasion: Not identified  Seminal Vesicle Invasion (invasion of muscular wall required): Not identified  Margins: Uninvolved by invasive carcinoma  Benign prostate glands present at surgical margin at the apex  Lymphovascular Invasion: Not identified  Perineural Invasion: Present  Regional Lymph Nodes  Number of Lymph Nodes Involved: 0  Number of Lymph Nodes Examined: 4  Pathologic Stage Classification (pTNM, AJCC 7th Edition): p T3a N0     3/24/2022- PSA 0.13. Discussed BCR. Discussed PSMA scan, EBRT, surveillance.  Last visit here 2/2/2020. Continent    10/13/2022- patient elected AS (active surveillance) at last visit. PSA 0.32.  Discussed BCR (biochemical recurrence). PSMA scan.      Lab Results   Component Value Date    PSADIAG 0.32 09/27/2022    PSADIAG 0.17 06/09/2022    PSADIAG 0.13 03/21/2022    PSADIAG 0.07 08/23/2021    PSADIAG 0.07 02/15/2021    PSADIAG <0.06 08/24/2020    PSADIAG <0.06 08/24/2020    PSADIAG <0.06 02/18/2020    PSADIAG <0.01 02/11/2019    PSADIAG <0.01 02/05/2018      Past Medical History:   Diagnosis Date    Allergy     Colon polyps     COPD (chronic obstructive pulmonary disease)     Diabetes mellitus     Diabetes mellitus due to abnormal insulin 7/25/2017     Diverticulosis     Fracture, ribs     Hypercholesterolemia     Hyperlipemia     Hypertension     Prostate cancer 2017    Seizure      Family History   Problem Relation Age of Onset    Hypertension Father     Cancer Mother     Cancer Paternal Grandmother     Prostate cancer Paternal Grandfather     Cancer Maternal Grandmother      Social History     Socioeconomic History    Marital status:    Occupational History    Occupation:    Tobacco Use    Smoking status: Light Smoker     Packs/day: 1.50     Types: Vaping with nicotine, Cigarettes     Start date:      Last attempt to quit:      Years since quittin.7    Smokeless tobacco: Never    Tobacco comments:     currently vapes, former cigarette smoker   Substance and Sexual Activity    Alcohol use: No    Drug use: No    Sexual activity: Yes     Partners: Female     Past Surgical History:   Procedure Laterality Date    BACK SURGERY      COLONOSCOPY N/A 2019    Procedure: HIGH RISK SCREENING COLONOSCOPY;  Surgeon: Kaiser Soler MD;  Location: [facility];  Service: Endoscopy;  Laterality: N/A;    COLONOSCOPY W/ POLYPECTOMY      FINGER AMPUTATION Left 2020    Procedure: AMPUTATION, FINGER - Left index finger completion of amputation at the level of the PIP joint MIDDLE FINGER PIP UCL REPAIR ;  Surgeon: Kaden Wilkins MD;  Location: [facility] OR;  Service: Orthopedics;  Laterality: Left;  Covid Neg     lamputation of little finger tip  1979    MOUTH SURGERY      OPEN REDUCTION AND INTERNAL FIXATION (ORIF) OF INJURY OF FINGER Left 2020    Procedure: ORIF, FINGER RING- left ring finger;  Surgeon: Kaden Wilkins MD;  Location: [facility] OR;  Service: Orthopedics;  Laterality: Left;    PROSTATECTOMY      REPAIR OF LIGAMENT Left 2020    Procedure: REPAIR, LIGAMENT - Left middle finger ulnar collateral ligament repair of the PIP joint and transarticular pinning;  Surgeon: Kaden  "MD Franky;  Location: [facility] OR;  Service: Orthopedics;  Laterality: Left;    REPAIR OF NERVE OF FINGER Left 11/23/2020    Procedure: REPAIR, NERVE, FINGER -  Left middle finger ulnar digital nerve repair with conduit;  Surgeon: Kaden Wilkins MD;  Location: [facility] OR;  Service: Orthopedics;  Laterality: Left;    tonsilectomy  1960    TONSILLECTOMY       Patient Active Problem List   Diagnosis    Prostate cancer    Diabetes mellitus due to abnormal insulin    Hypertension    Psoriasis    Male stress incontinence    Erectile dysfunction following radical prostatectomy    Syncope    STEMI (ST elevation myocardial infarction)    Sleep arousal disorder    History of colon polyps    Traumatic amputation of digit of left hand    Diabetes mellitus    COPD (chronic obstructive pulmonary disease)    Amputation of finger of left hand including thumb     Review of Systems   Constitutional:  Negative for appetite change, chills, fatigue, fever and unexpected weight change.   HENT:  Negative for nosebleeds.    Respiratory:  Negative for shortness of breath and wheezing.    Cardiovascular:  Negative for chest pain, palpitations and leg swelling.   Gastrointestinal:  Negative for abdominal distention, abdominal pain, constipation, diarrhea, nausea and vomiting.   Musculoskeletal:  Negative for arthralgias and back pain.   Skin:  Negative for pallor.   Neurological:  Negative for dizziness, seizures and syncope.   Hematological:  Negative for adenopathy.   Psychiatric/Behavioral:  Negative for dysphoric mood.        Objective:      There were no vitals taken for this visit.  Estimated body mass index is 28.17 kg/m² as calculated from the following:    Height as of 4/12/22: 5' 11" (1.803 m).    Weight as of 3/24/22: 91.6 kg (202 lb).  Physical Exam  Constitutional:       General: He is not in acute distress.     Appearance: He is well-developed. He is not diaphoretic.   HENT:      Head: Atraumatic.   Neck:      " Trachea: No tracheal deviation.   Cardiovascular:      Rate and Rhythm: Normal rate.   Pulmonary:      Effort: Pulmonary effort is normal. No respiratory distress.      Breath sounds: No wheezing.   Abdominal:      General: Bowel sounds are normal. There is no distension.      Palpations: Abdomen is soft. There is no mass.      Tenderness: There is no abdominal tenderness. There is no guarding or rebound.   Skin:     General: Skin is warm and dry.   Neurological:      Mental Status: He is alert and oriented to person, place, and time.   Psychiatric:         Behavior: Behavior normal.         Thought Content: Thought content normal.         Judgment: Judgment normal.       Assessment and Plan:           Problem List Items Addressed This Visit       Prostate cancer - Primary       Follow up:   PSMA scan, visit after.    Nickolas Kim

## 2022-10-25 ENCOUNTER — HOSPITAL ENCOUNTER (OUTPATIENT)
Dept: RADIOLOGY | Facility: HOSPITAL | Age: 67
Discharge: HOME | End: 2022-10-25
Attending: UROLOGY
Payer: MEDICARE

## 2022-11-02 ENCOUNTER — HOSPITAL ENCOUNTER (OUTPATIENT)
Dept: RADIOLOGY | Facility: HOSPITAL | Age: 67
Discharge: HOME | End: 2022-11-02
Attending: UROLOGY
Payer: MEDICARE

## 2022-11-02 DIAGNOSIS — C61 PROSTATE CANCER: ICD-10-CM

## 2022-11-02 PROCEDURE — 78815 NM PET CT F 18 PYL PSMA, MIDTHIGH TO VERTEX: ICD-10-PCS | Mod: 26,PI,, | Performed by: RADIOLOGY

## 2022-11-02 PROCEDURE — 78815 PET IMAGE W/CT SKULL-THIGH: CPT | Mod: 26,PI,, | Performed by: RADIOLOGY

## 2022-11-02 PROCEDURE — 78815 PET IMAGE W/CT SKULL-THIGH: CPT | Mod: PI,TC

## 2022-11-02 NOTE — PROGRESS NOTES
[facility]  Urologic Oncology Clinic Note        Date of Service: 11/3/2022    Chief Complaint/Reason for Consultation: Biochemically Recurrent Prostate Cancer    Requesting Provider:   No referring provider defined for this encounter.      History of Present Illness:   Patient 67 y.o. male presents for follow up of prostate cancer recurrence.     No consitutional complaints. Doing well. Would like to be aggressive about his rising PSA. Would like radiation oncology consult.       Urologic History:     8/28/2017 -- RALP -- Dr. Kim [id number] [place] 3+3  2/5/2018 -- PSA -- undetectable  2/11/2019 -- PSA -- undetectable  8/23/2021 -- PSA 0.07  3/24/2022 -- PSA 0.13 -- decided on AS for BCR  6/9/2022 -- PSA 0.17  10/13/2022 -- PSA 0.32 -- decided on PMSA PET  11/3/2022 -- Discussed PMSA PET with patient, faint focal uptake at vesicourethral anastomosis.     Patient Active Problem List    Diagnosis Date Noted    Traumatic amputation of digit of left hand 11/23/2020    Amputation of finger of left hand including thumb 11/23/2020    Diabetes mellitus     COPD (chronic obstructive pulmonary disease)     History of colon polyps 11/21/2019    Sleep arousal disorder     Syncope 02/24/2018    STEMI (ST elevation myocardial infarction)     Male stress incontinence 10/05/2017    Erectile dysfunction following radical prostatectomy 10/05/2017    Prostate cancer 07/25/2017    Diabetes mellitus due to abnormal insulin 07/25/2017    Hypertension 07/25/2017    Psoriasis 07/25/2017          Review of patient's allergies indicates:   Allergen Reactions    Penicillins Rash    Wellbutrin [bupropion hcl] Rash        Past Medical History:   Diagnosis Date    Allergy     Colon polyps     COPD (chronic obstructive pulmonary disease)     Diabetes mellitus     Diabetes mellitus due to abnormal insulin 7/25/2017    Diverticulosis     Fracture, ribs     Hypercholesterolemia     Hyperlipemia     Hypertension     Prostate cancer 08/2017     Seizure       Past Surgical History:   Procedure Laterality Date    BACK SURGERY      COLONOSCOPY N/A 2019    Procedure: HIGH RISK SCREENING COLONOSCOPY;  Surgeon: Kaiser Soler MD;  Location: [facility] ENDO;  Service: Endoscopy;  Laterality: N/A;    COLONOSCOPY W/ POLYPECTOMY      FINGER AMPUTATION Left 2020    Procedure: AMPUTATION, FINGER - Left index finger completion of amputation at the level of the PIP joint MIDDLE FINGER PIP UCL REPAIR ;  Surgeon: Kaden Wilkins MD;  Location: [facility] OR;  Service: Orthopedics;  Laterality: Left;  Covid Neg     lamputation of little finger tip  1979    MOUTH SURGERY      OPEN REDUCTION AND INTERNAL FIXATION (ORIF) OF INJURY OF FINGER Left 2020    Procedure: ORIF, FINGER RING- left ring finger;  Surgeon: Kaden Wilkins MD;  Location: [facility] OR;  Service: Orthopedics;  Laterality: Left;    PROSTATECTOMY      REPAIR OF LIGAMENT Left 2020    Procedure: REPAIR, LIGAMENT - Left middle finger ulnar collateral ligament repair of the PIP joint and transarticular pinning;  Surgeon: Kaden Wilkins MD;  Location: [facility] OR;  Service: Orthopedics;  Laterality: Left;    REPAIR OF NERVE OF FINGER Left 2020    Procedure: REPAIR, NERVE, FINGER -  Left middle finger ulnar digital nerve repair with conduit;  Surgeon: Kaden Wilkins MD;  Location: [facility] OR;  Service: Orthopedics;  Laterality: Left;    tonsilectomy  1960    TONSILLECTOMY        Family History   Problem Relation Age of Onset    Hypertension Father     Cancer Mother     Cancer Paternal Grandmother     Prostate cancer Paternal Grandfather     Cancer Maternal Grandmother       Social History     Tobacco Use    Smoking status: Light Smoker     Packs/day: 1.50     Types: Vaping with nicotine, Cigarettes     Start date:      Last attempt to quit: 2014     Years since quittin.8    Smokeless tobacco: Never    Tobacco comments:     currently vapes, former cigarette smoker   Substance  "Use Topics    Alcohol use: No        Review of Systems   Constitutional:  Negative for chills, fatigue and fever.   HENT:  Negative for congestion, sneezing and sore throat.    Respiratory:  Negative for cough, shortness of breath and wheezing.    Cardiovascular:  Negative for chest pain, palpitations and leg swelling.   Gastrointestinal:  Negative for abdominal pain, constipation, diarrhea, nausea and vomiting.   Genitourinary:  Negative for dysuria, hematuria and urgency.   Musculoskeletal: Negative.    Skin:  Negative for color change, pallor and rash.   Neurological:  Negative for dizziness, weakness and headaches.   Hematological:  Does not bruise/bleed easily.   Psychiatric/Behavioral: Negative.             OBJECTIVE:     Vitals:    11/03/22 1327   BP: (!) 170/90   BP Location: Right arm   Pulse: (!) 57   Weight: 93.2 kg (205 lb 7.5 oz)   Height: 5' 11" (1.803 m)          General Appearance: Alert, cooperative, no distress  Head: Normocephalic  Eyes: Clear conjunctiva  Neck: No obvious LND or JVD  Lungs: Normal chest excursion, no accessory muscle use  Chest: Regular rate rhythm by palpation, no pedal edema  Abdomen: Soft, non-tender, non-distended, well-healed port sites, no hernias.  Extremities: Atraumatic   Lymph Nodes: No appreciable lymph adenopathy  Neurologic: No gross gait, motor or sensory deficits    LAB:      Lab Results   Component Value Date    PSADIAG 0.32 09/27/2022    PSADIAG 0.17 06/09/2022    PSADIAG 0.13 03/21/2022    PSADIAG 0.07 08/23/2021    PSADIAG 0.07 02/15/2021    PSADIAG <0.06 08/24/2020    PSADIAG <0.06 08/24/2020    PSADIAG <0.06 02/18/2020    PSADIAG <0.01 02/11/2019    PSADIAG <0.01 02/05/2018       IMAGING:      EXAMINATION:  NM PET CT F 18 PYL PSMA, MIDTHIGH TO VERTEX     CLINICAL HISTORY:  Malignant neoplasm of prostate     TECHNIQUE:  Approximately 60 minutes following the intravenous injection of 10.12 mCi F-18 piflufolastat, PET images were acquired from the proximal " thighs to the skull vertex. CT images were also acquired for attenuation correction and anatomic localization and performed without oral or IV contrast.     COMPARISON:  None     FINDINGS:  In the head and neck, there is physiologic uptake in the lacrimal and salivary glands, and there are no tracer avid lesions suspicious for malignancy.     In the chest, there are no tracer avid lesions suspicious for malignancy.     In the abdomen and pelvis, there is physiologic distribution in the liver, spleen, bowel, and genitourinary tract, and there are no tracer avid lesions suspicious for malignancy.  Postoperative changes of prostatectomy with faint focal activity at the vesicourethral anastomosis with a maximum SUV of 7.3.     There is nonspecific uptake in the inguinal lymph nodes.     In the bones, there is physiologic uptake in the bone marrow.  Mild sclerosis of the right lateral aspects of the L2 and L3 vertebral bodies and mild uptake within the L2 vertebral body that is likely degenerative..     Impression:     Faint focal activity the vesicourethral anastomosis in keeping with or recurrence.     No definite evidence of metastatic disease.  Mildly tracer avid sclerosis in the lumbar spine favored to be degenerative.     I, Shabbir Butcher MD, attest that I reviewed and interpreted the images.     Electronically signed by resident: Markos Dorman  Date:                                            11/02/2022  Time:                                           14:14     Electronically signed by: Shabbir Butcher  Date:                                            11/02/2022  Time:                                           15:05      ASSESSMENT/PLAN:     68 yo M w hx of BCR after RALP in 2017 for [id number], Hira 3+3    Plan:  - Discussed PSMA results with patient and rising PSA  - Referral to radiation oncology    Wai Muhammad MD  Department of Urology  PGY-4  Pager: [phone]      Attending Note:     Discussed at length  the role of early salvage radiation for biochemically recurrent prostate cancer in the setting of negative PSMA PET (reviewed imaging with patient today) for widely metastatic disease and focal uptake locally where prostate was resected. We discussed the consequences of secondary treatment and adverse functional outcomes. Encouraged the patient to discuss the risks of radiation further with radiation oncology colleagues. Overall we explained to the patient that salvage radiation therapy would lengthen the time to further progression. He would like to rad/onc consultation, which was placed today.    I have reviewed and concur with the resident's history, physical, assessment, and plan.  I have personally interviewed and examined the patient at bedside.  See below addendum for my evaluation and additional findings.       The total time for the new patient visit was at least 60 minutes in both face-to-face and non-face-to-face activities, which included chart review, interpretation of results, counseling, education, ordering meds/tests/procedures, and/or coordination of care.      Patrice Meier MD  Urologic Oncology  P: [phone]

## 2022-11-03 ENCOUNTER — OFFICE VISIT (OUTPATIENT)
Dept: UROLOGY | Facility: CLINIC | Age: 67
End: 2022-11-03
Payer: MEDICARE

## 2022-11-03 VITALS
DIASTOLIC BLOOD PRESSURE: 90 MMHG | HEART RATE: 57 BPM | HEIGHT: 71 IN | BODY MASS INDEX: 28.77 KG/M2 | WEIGHT: 205.5 LBS | SYSTOLIC BLOOD PRESSURE: 170 MMHG

## 2022-11-03 DIAGNOSIS — C61 PROSTATE CANCER: Primary | ICD-10-CM

## 2022-11-03 PROCEDURE — 99215 OFFICE O/P EST HI 40 MIN: CPT | Mod: S$GLB,,, | Performed by: STUDENT IN AN ORGANIZED HEALTH CARE EDUCATION/TRAINING PROGRAM

## 2022-11-03 PROCEDURE — 1126F AMNT PAIN NOTED NONE PRSNT: CPT | Mod: CPTII,S$GLB,, | Performed by: STUDENT IN AN ORGANIZED HEALTH CARE EDUCATION/TRAINING PROGRAM

## 2022-11-03 PROCEDURE — 3080F DIAST BP >= 90 MM HG: CPT | Mod: CPTII,S$GLB,, | Performed by: STUDENT IN AN ORGANIZED HEALTH CARE EDUCATION/TRAINING PROGRAM

## 2022-11-03 PROCEDURE — 99999 PR PBB SHADOW E&M-EST. PATIENT-LVL III: CPT | Mod: PBBFAC,,, | Performed by: STUDENT IN AN ORGANIZED HEALTH CARE EDUCATION/TRAINING PROGRAM

## 2022-11-03 PROCEDURE — 99999 PR PBB SHADOW E&M-EST. PATIENT-LVL III: ICD-10-PCS | Mod: PBBFAC,,, | Performed by: STUDENT IN AN ORGANIZED HEALTH CARE EDUCATION/TRAINING PROGRAM

## 2022-11-03 PROCEDURE — 1159F MED LIST DOCD IN RCRD: CPT | Mod: CPTII,S$GLB,, | Performed by: STUDENT IN AN ORGANIZED HEALTH CARE EDUCATION/TRAINING PROGRAM

## 2022-11-03 PROCEDURE — 3051F HG A1C>EQUAL 7.0%<8.0%: CPT | Mod: CPTII,S$GLB,, | Performed by: STUDENT IN AN ORGANIZED HEALTH CARE EDUCATION/TRAINING PROGRAM

## 2022-11-03 PROCEDURE — 3077F PR MOST RECENT SYSTOLIC BLOOD PRESSURE >= 140 MM HG: ICD-10-PCS | Mod: CPTII,S$GLB,, | Performed by: STUDENT IN AN ORGANIZED HEALTH CARE EDUCATION/TRAINING PROGRAM

## 2022-11-03 PROCEDURE — 99215 PR OFFICE/OUTPT VISIT, EST, LEVL V, 40-54 MIN: ICD-10-PCS | Mod: S$GLB,,, | Performed by: STUDENT IN AN ORGANIZED HEALTH CARE EDUCATION/TRAINING PROGRAM

## 2022-11-03 PROCEDURE — 3008F PR BODY MASS INDEX (BMI) DOCUMENTED: ICD-10-PCS | Mod: CPTII,S$GLB,, | Performed by: STUDENT IN AN ORGANIZED HEALTH CARE EDUCATION/TRAINING PROGRAM

## 2022-11-03 PROCEDURE — 3080F PR MOST RECENT DIASTOLIC BLOOD PRESSURE >= 90 MM HG: ICD-10-PCS | Mod: CPTII,S$GLB,, | Performed by: STUDENT IN AN ORGANIZED HEALTH CARE EDUCATION/TRAINING PROGRAM

## 2022-11-03 PROCEDURE — 1126F PR PAIN SEVERITY QUANTIFIED, NO PAIN PRESENT: ICD-10-PCS | Mod: CPTII,S$GLB,, | Performed by: STUDENT IN AN ORGANIZED HEALTH CARE EDUCATION/TRAINING PROGRAM

## 2022-11-03 PROCEDURE — 1159F PR MEDICATION LIST DOCUMENTED IN MEDICAL RECORD: ICD-10-PCS | Mod: CPTII,S$GLB,, | Performed by: STUDENT IN AN ORGANIZED HEALTH CARE EDUCATION/TRAINING PROGRAM

## 2022-11-03 PROCEDURE — 3051F PR MOST RECENT HEMOGLOBIN A1C LEVEL 7.0 - < 8.0%: ICD-10-PCS | Mod: CPTII,S$GLB,, | Performed by: STUDENT IN AN ORGANIZED HEALTH CARE EDUCATION/TRAINING PROGRAM

## 2022-11-03 PROCEDURE — 3008F BODY MASS INDEX DOCD: CPT | Mod: CPTII,S$GLB,, | Performed by: STUDENT IN AN ORGANIZED HEALTH CARE EDUCATION/TRAINING PROGRAM

## 2022-11-03 PROCEDURE — 3077F SYST BP >= 140 MM HG: CPT | Mod: CPTII,S$GLB,, | Performed by: STUDENT IN AN ORGANIZED HEALTH CARE EDUCATION/TRAINING PROGRAM

## 2022-11-09 ENCOUNTER — TELEPHONE (OUTPATIENT)
Dept: RADIATION ONCOLOGY | Facility: CLINIC | Age: 67
End: 2022-11-09
Payer: MEDICARE

## 2022-11-09 NOTE — TELEPHONE ENCOUNTER
Called to scheduled consultation; no answer, left voicemail requesting a call back. Contact information provided.

## 2022-11-15 ENCOUNTER — OFFICE VISIT (OUTPATIENT)
Dept: RADIATION ONCOLOGY | Facility: CLINIC | Age: 67
End: 2022-11-15
Payer: MEDICARE

## 2022-11-15 VITALS
DIASTOLIC BLOOD PRESSURE: 78 MMHG | SYSTOLIC BLOOD PRESSURE: 154 MMHG | HEIGHT: 71 IN | BODY MASS INDEX: 28.87 KG/M2 | HEART RATE: 66 BPM | WEIGHT: 206.19 LBS | RESPIRATION RATE: 16 BRPM

## 2022-11-15 DIAGNOSIS — C61 PROSTATE CANCER: ICD-10-CM

## 2022-11-15 PROCEDURE — 99999 PR PBB SHADOW E&M-EST. PATIENT-LVL IV: CPT | Mod: PBBFAC,,, | Performed by: RADIOLOGY

## 2022-11-15 PROCEDURE — 3077F PR MOST RECENT SYSTOLIC BLOOD PRESSURE >= 140 MM HG: ICD-10-PCS | Mod: CPTII,S$GLB,, | Performed by: RADIOLOGY

## 2022-11-15 PROCEDURE — 1159F MED LIST DOCD IN RCRD: CPT | Mod: CPTII,S$GLB,, | Performed by: RADIOLOGY

## 2022-11-15 PROCEDURE — 3078F DIAST BP <80 MM HG: CPT | Mod: CPTII,S$GLB,, | Performed by: RADIOLOGY

## 2022-11-15 PROCEDURE — 3077F SYST BP >= 140 MM HG: CPT | Mod: CPTII,S$GLB,, | Performed by: RADIOLOGY

## 2022-11-15 PROCEDURE — 1126F AMNT PAIN NOTED NONE PRSNT: CPT | Mod: CPTII,S$GLB,, | Performed by: RADIOLOGY

## 2022-11-15 PROCEDURE — 1159F PR MEDICATION LIST DOCUMENTED IN MEDICAL RECORD: ICD-10-PCS | Mod: CPTII,S$GLB,, | Performed by: RADIOLOGY

## 2022-11-15 PROCEDURE — 99204 OFFICE O/P NEW MOD 45 MIN: CPT | Mod: S$GLB,,, | Performed by: RADIOLOGY

## 2022-11-15 PROCEDURE — 99204 PR OFFICE/OUTPT VISIT, NEW, LEVL IV, 45-59 MIN: ICD-10-PCS | Mod: S$GLB,,, | Performed by: RADIOLOGY

## 2022-11-15 PROCEDURE — 3051F PR MOST RECENT HEMOGLOBIN A1C LEVEL 7.0 - < 8.0%: ICD-10-PCS | Mod: CPTII,S$GLB,, | Performed by: RADIOLOGY

## 2022-11-15 PROCEDURE — 3288F FALL RISK ASSESSMENT DOCD: CPT | Mod: CPTII,S$GLB,, | Performed by: RADIOLOGY

## 2022-11-15 PROCEDURE — 3008F PR BODY MASS INDEX (BMI) DOCUMENTED: ICD-10-PCS | Mod: CPTII,S$GLB,, | Performed by: RADIOLOGY

## 2022-11-15 PROCEDURE — 1160F RVW MEDS BY RX/DR IN RCRD: CPT | Mod: CPTII,S$GLB,, | Performed by: RADIOLOGY

## 2022-11-15 PROCEDURE — 1101F PT FALLS ASSESS-DOCD LE1/YR: CPT | Mod: CPTII,S$GLB,, | Performed by: RADIOLOGY

## 2022-11-15 PROCEDURE — 3288F PR FALLS RISK ASSESSMENT DOCUMENTED: ICD-10-PCS | Mod: CPTII,S$GLB,, | Performed by: RADIOLOGY

## 2022-11-15 PROCEDURE — 3051F HG A1C>EQUAL 7.0%<8.0%: CPT | Mod: CPTII,S$GLB,, | Performed by: RADIOLOGY

## 2022-11-15 PROCEDURE — 1126F PR PAIN SEVERITY QUANTIFIED, NO PAIN PRESENT: ICD-10-PCS | Mod: CPTII,S$GLB,, | Performed by: RADIOLOGY

## 2022-11-15 PROCEDURE — 1160F PR REVIEW ALL MEDS BY PRESCRIBER/CLIN PHARMACIST DOCUMENTED: ICD-10-PCS | Mod: CPTII,S$GLB,, | Performed by: RADIOLOGY

## 2022-11-15 PROCEDURE — 1101F PR PT FALLS ASSESS DOC 0-1 FALLS W/OUT INJ PAST YR: ICD-10-PCS | Mod: CPTII,S$GLB,, | Performed by: RADIOLOGY

## 2022-11-15 PROCEDURE — 99999 PR PBB SHADOW E&M-EST. PATIENT-LVL IV: ICD-10-PCS | Mod: PBBFAC,,, | Performed by: RADIOLOGY

## 2022-11-15 PROCEDURE — 3078F PR MOST RECENT DIASTOLIC BLOOD PRESSURE < 80 MM HG: ICD-10-PCS | Mod: CPTII,S$GLB,, | Performed by: RADIOLOGY

## 2022-11-15 PROCEDURE — 3008F BODY MASS INDEX DOCD: CPT | Mod: CPTII,S$GLB,, | Performed by: RADIOLOGY

## 2022-11-15 RX ORDER — METOPROLOL SUCCINATE 50 MG/1
TABLET, EXTENDED RELEASE ORAL
COMMUNITY
Start: 2022-10-29

## 2022-11-15 RX ORDER — PHENTERMINE HYDROCHLORIDE 37.5 MG/1
18.75 TABLET ORAL 2 TIMES DAILY
COMMUNITY
Start: 2022-09-16 | End: 2024-01-23

## 2022-11-15 RX ORDER — ESCITALOPRAM OXALATE 20 MG/1
20 TABLET ORAL DAILY
COMMUNITY
Start: 2022-11-11

## 2022-11-15 RX ORDER — BICALUTAMIDE 50 MG/1
50 TABLET, FILM COATED ORAL DAILY
Qty: 90 TABLET | Refills: 1 | Status: SHIPPED | OUTPATIENT
Start: 2022-11-15 | End: 2023-07-14

## 2022-11-15 NOTE — PROGRESS NOTES
Multidisciplinary Uro Oncology Clinic    HISTORY OF PRESENT ILLNESS:   This patient presents for discussion of salvage irradiation for his prostate cancer.     Mr. Knott initially presented in 2017 with an elevated PSA of 6.7 ng/ml.  Biopsies revealed [place] 7 (3+4) adenocarcinoma involving the Rt. mid gland and Rt. apex.  He subsequently underwent RALP with bilateral node dissection in August of 2017.  Pathology revealed [place] 6 (3+3) adenocarcinoma with a tertiary pattern [place] 4.  There was evidence of extraprostatic extension and perineural invasion.  There was no seminal vesicle or bladder neck invasion.  The margins of resection and 4 pelvic nodes were negative for tumor involvement.  The patient recovered well  His PSA remained < 0.01 ng/ml until February of 2021 when it returned at 0.07 ng/ml.  His PSA has increased since that time. His most recent PSA on 9/27/22 returned at 0.32 ng/ml.  PSMA scan on 11/2/22 revealed postoperative changes with faint focal activity at the vesicourethral anastomosis.  There was non specific uptake in the inguinal nodes.  Mild sclerosis in the lateral aspects of L2 and L3 likely degenerative.  Today the patient states he feels well.  No  complaints.       REVIEW OF SYSTEMS:   Review of Systems   Constitutional:  Negative for chills, fever, malaise/fatigue and weight loss.   Respiratory:  Negative for cough, sputum production and shortness of breath.    Cardiovascular:  Negative for chest pain, palpitations and leg swelling.   Gastrointestinal:  Negative for abdominal pain, constipation and diarrhea.   Genitourinary:  Negative for dysuria, frequency, hematuria and urgency.        Denies incontinence.        PAST MEDICAL HISTORY:  Past Medical History:   Diagnosis Date    Allergy     Colon polyps     COPD (chronic obstructive pulmonary disease)     Diabetes mellitus     Diabetes mellitus due to abnormal insulin 7/25/2017    Diverticulosis     Fracture, ribs      Hypercholesterolemia     Hyperlipemia     Hypertension     Prostate cancer 08/2017    Seizure        PAST SURGICAL HISTORY:  Past Surgical History:   Procedure Laterality Date    BACK SURGERY  1974    COLONOSCOPY N/A 11/21/2019    Procedure: HIGH RISK SCREENING COLONOSCOPY;  Surgeon: Kaiser Soler MD;  Location: [facility] ENDO;  Service: Endoscopy;  Laterality: N/A;    COLONOSCOPY W/ POLYPECTOMY      FINGER AMPUTATION Left 11/23/2020    Procedure: AMPUTATION, FINGER - Left index finger completion of amputation at the level of the PIP joint MIDDLE FINGER PIP UCL REPAIR ;  Surgeon: Kaden Wilkins MD;  Location: [facility] OR;  Service: Orthopedics;  Laterality: Left;  Covid Neg 11/23    lamputation of little finger tip  1979    MOUTH SURGERY  1972    OPEN REDUCTION AND INTERNAL FIXATION (ORIF) OF INJURY OF FINGER Left 11/23/2020    Procedure: ORIF, FINGER RING- left ring finger;  Surgeon: Kaden Wilkins MD;  Location: [facility] OR;  Service: Orthopedics;  Laterality: Left;    PROSTATECTOMY      REPAIR OF LIGAMENT Left 11/23/2020    Procedure: REPAIR, LIGAMENT - Left middle finger ulnar collateral ligament repair of the PIP joint and transarticular pinning;  Surgeon: Kaden Wilkins MD;  Location: [facility] OR;  Service: Orthopedics;  Laterality: Left;    REPAIR OF NERVE OF FINGER Left 11/23/2020    Procedure: REPAIR, NERVE, FINGER -  Left middle finger ulnar digital nerve repair with conduit;  Surgeon: Kaden Wilkins MD;  Location: [facility] OR;  Service: Orthopedics;  Laterality: Left;    tonsilectomy  1960    TONSILLECTOMY         ALLERGIES:   Review of patient's allergies indicates:   Allergen Reactions    Penicillins Rash    Wellbutrin [bupropion hcl] Rash       MEDICATIONS:  Current Outpatient Medications   Medication Sig    EScitalopram oxalate (LEXAPRO) 20 MG tablet Take 20 mg by mouth once daily.    FREESTYLE LITE STRIPS Strp TEST BLOOD SUGAR D    metFORMIN (GLUCOPHAGE-XR) 500 MG 24 hr tablet Take 1,000 mg by mouth once  daily.     metoprolol succinate (TOPROL-XL) 50 MG 24 hr tablet     naloxone (NARCAN) 1 mg/mL injection 2 mg (1 mg per nostril) by Nasal route as needed for opioid overdose; may repeat in 3 to 5 minutes if not effective. Call 911    naloxone (NARCAN) 4 mg/actuation Spry 4mg by nasal route as needed for opioid overdose; may repeat every 2-3 minutes in alternating nostrils until medical help arrives. Call 911    phentermine (ADIPEX-P) 37.5 mg tablet Take 18.75 mg by mouth 2 (two) times daily.    pravastatin (PRAVACHOL) 40 MG tablet Take 40 mg by mouth every evening.     QUEtiapine (SEROQUEL) 25 MG Tab Take 25 mg by mouth once daily.    secukinumab (COSENTYX) 150 mg/mL Syrg Inject 300 mg into the skin every 30 days.    bicalutamide (CASODEX) 50 MG Tab Take 1 tablet (50 mg total) by mouth once daily.     Current Facility-Administered Medications   Medication    [START ON 2022] leuprolide acetate (6 month) injection 45 mg       SOCIAL HISTORY:  Social History     Socioeconomic History    Marital status:    Occupational History    Occupation:    Tobacco Use    Smoking status: Light Smoker     Packs/day: 1.50     Types: Vaping with nicotine, Cigarettes     Start date:      Last attempt to quit:      Years since quittin.8    Smokeless tobacco: Never    Tobacco comments:     currently vapes, former cigarette smoker   Substance and Sexual Activity    Alcohol use: No    Drug use: No    Sexual activity: Yes     Partners: Female       FAMILY HISTORY:  Family History   Problem Relation Age of Onset    Hypertension Father     Cancer Mother     Cancer Paternal Grandmother     Prostate cancer Paternal Grandfather     Cancer Maternal Grandmother          PHYSICAL EXAMINATION:  Vitals:    11/15/22 1337   BP: (!) 154/78   Pulse: 66   Resp: 16     Physical Exam  Constitutional:       General: He is not in acute distress.     Appearance: Normal appearance.   Pulmonary:      Effort: Pulmonary effort is  normal. No respiratory distress.   Abdominal:      General: Abdomen is flat. There is no distension.   Neurological:      Mental Status: He is alert and oriented to person, place, and time.   Psychiatric:         Mood and Affect: Mood normal.         Judgment: Judgment normal.      Latest Reference Range & Units 20 08:15 02/15/21 08:45 21 09:00 22 09:30 22 08:05   PSA Diagnostic 0.00 - 4.00 ng/mL <0.06  <0.06 0.07 0.07 0.17 0.32       ASSESSMENT/PLAN:   History of Pathologic stage III (T3a, N0, M0, GG1, PSA < 10) prostate cancer with evidence of biochemical failure.      ECO    I had a long discussion with the patient.  Reviewed his PSA results and explained he is felt to have a recurrence of his prostate cancer.  Discussed the differences in local vs distant recurrent disease.  Explained the role of salvage irradiation in this setting. Discussed the results of combined irradiation and short term hormonal deprivation therapy.  Discussed the procedures, risks and benefits of therapy.  Discussed the acute and long term risk of therapy.  The patient was agreeable to proceed with radiotherapy and short course hormonal deprivation therapy.  Will plan to begin Casodex today with Lupron injection planned for 22,  Will plan to begin radiotherapy some 6 - 8 weeks following his Lupron injection.  Thank you for allowing us to participate in the care of this patient.      Psychosocial Distress screening score of Distress Score: 1 noted and reviewed. No intervention indicated.     I spent approximately 45 minutes reviewing the available records and evaluating the patient, out of which over 50% of the time was spent face to face with the patient in counseling and coordinating this patient's care.

## 2022-11-29 ENCOUNTER — CLINICAL SUPPORT (OUTPATIENT)
Dept: RADIATION ONCOLOGY | Facility: CLINIC | Age: 67
End: 2022-11-29
Payer: MEDICARE

## 2022-11-29 PROCEDURE — 96402 PR CHEMOTHER HORMON ANTINEOPL SUB-Q/IM: ICD-10-PCS | Mod: S$GLB,,, | Performed by: STUDENT IN AN ORGANIZED HEALTH CARE EDUCATION/TRAINING PROGRAM

## 2022-11-29 PROCEDURE — 96402 CHEMO HORMON ANTINEOPL SQ/IM: CPT | Mod: S$GLB,,, | Performed by: STUDENT IN AN ORGANIZED HEALTH CARE EDUCATION/TRAINING PROGRAM

## 2022-11-29 NOTE — PROGRESS NOTES
6 mo Lupron (45 mg) IM administered in the right upper outer quadrant gluteus. Tolerated well. Planning session scheduled; instructions given, verbalized understanding.

## 2023-01-18 ENCOUNTER — HOSPITAL ENCOUNTER (OUTPATIENT)
Dept: RADIATION THERAPY | Facility: HOSPITAL | Age: 68
Discharge: HOME | End: 2023-01-18
Attending: RADIOLOGY
Payer: MEDICARE

## 2023-01-18 PROCEDURE — 77334 PR  RADN TREATMENT AID(S) COMPLX: ICD-10-PCS | Mod: 26,,, | Performed by: RADIOLOGY

## 2023-01-18 PROCEDURE — 77263 PR  RADIATION THERAPY PLAN COMPLEX: ICD-10-PCS | Mod: ,,, | Performed by: RADIOLOGY

## 2023-01-18 PROCEDURE — 77263 THER RADIOLOGY TX PLNG CPLX: CPT | Mod: ,,, | Performed by: RADIOLOGY

## 2023-01-18 PROCEDURE — 77014 PR  CT GUIDANCE PLACEMENT RAD THERAPY FIELDS: ICD-10-PCS | Mod: 26,,, | Performed by: RADIOLOGY

## 2023-01-18 PROCEDURE — 77334 RADIATION TREATMENT AID(S): CPT | Mod: 26,,, | Performed by: RADIOLOGY

## 2023-01-18 PROCEDURE — 77014 HC CT GUIDANCE RADIATION THERAPY FLDS PLACEMENT: CPT | Mod: TC | Performed by: RADIOLOGY

## 2023-01-18 PROCEDURE — 77014 PR  CT GUIDANCE PLACEMENT RAD THERAPY FIELDS: CPT | Mod: 26,,, | Performed by: RADIOLOGY

## 2023-01-18 PROCEDURE — 77334 RADIATION TREATMENT AID(S): CPT | Mod: TC | Performed by: RADIOLOGY

## 2023-01-24 PROCEDURE — 77301 RADIOTHERAPY DOSE PLAN IMRT: CPT | Mod: 26,,, | Performed by: RADIOLOGY

## 2023-01-24 PROCEDURE — 77301 RADIOTHERAPY DOSE PLAN IMRT: CPT | Mod: TC | Performed by: RADIOLOGY

## 2023-01-24 PROCEDURE — 77301 PR  INTEN MOD RADIOTHER PLAN W/DOSE VOL HIST: ICD-10-PCS | Mod: 26,,, | Performed by: RADIOLOGY

## 2023-01-25 ENCOUNTER — DOCUMENTATION ONLY (OUTPATIENT)
Dept: RADIATION ONCOLOGY | Facility: CLINIC | Age: 68
End: 2023-01-25
Payer: MEDICARE

## 2023-01-25 PROCEDURE — 77300 RADIATION THERAPY DOSE PLAN: CPT | Mod: TC | Performed by: RADIOLOGY

## 2023-01-25 PROCEDURE — 77014 HC CT GUIDANCE RADIATION THERAPY FLDS PLACEMENT: CPT | Mod: TC | Performed by: RADIOLOGY

## 2023-01-25 PROCEDURE — 77385 HC IMRT, SIMPLE: CPT | Performed by: RADIOLOGY

## 2023-01-25 PROCEDURE — 77300 PR RADIATION THERAPY,DOSIMETRY PLAN: ICD-10-PCS | Mod: 26,,, | Performed by: RADIOLOGY

## 2023-01-25 PROCEDURE — 77338 DESIGN MLC DEVICE FOR IMRT: CPT | Mod: TC | Performed by: RADIOLOGY

## 2023-01-25 PROCEDURE — 77338 DESIGN MLC DEVICE FOR IMRT: CPT | Mod: 26,,, | Performed by: RADIOLOGY

## 2023-01-25 PROCEDURE — 77014 PR  CT GUIDANCE PLACEMENT RAD THERAPY FIELDS: ICD-10-PCS | Mod: 26,,, | Performed by: RADIOLOGY

## 2023-01-25 PROCEDURE — 77014 PR  CT GUIDANCE PLACEMENT RAD THERAPY FIELDS: CPT | Mod: 26,,, | Performed by: RADIOLOGY

## 2023-01-25 PROCEDURE — 77300 RADIATION THERAPY DOSE PLAN: CPT | Mod: 26,,, | Performed by: RADIOLOGY

## 2023-01-25 PROCEDURE — 77338 PR  MLC IMRT DESIGN & CONSTRUCTION PER IMRT PLAN: ICD-10-PCS | Mod: 26,,, | Performed by: RADIOLOGY

## 2023-01-26 PROCEDURE — 77014 PR  CT GUIDANCE PLACEMENT RAD THERAPY FIELDS: ICD-10-PCS | Mod: 26,,, | Performed by: RADIOLOGY

## 2023-01-26 PROCEDURE — 77385 HC IMRT, SIMPLE: CPT | Performed by: RADIOLOGY

## 2023-01-26 PROCEDURE — 77014 HC CT GUIDANCE RADIATION THERAPY FLDS PLACEMENT: CPT | Mod: TC | Performed by: RADIOLOGY

## 2023-01-26 PROCEDURE — 77014 PR  CT GUIDANCE PLACEMENT RAD THERAPY FIELDS: CPT | Mod: 26,,, | Performed by: RADIOLOGY

## 2023-01-26 NOTE — PLAN OF CARE
Day 1 of outpatient radiation to prostate bed. Doing well. No  or GI issues. Tolerated therapy well.

## 2023-01-27 PROCEDURE — 77014 PR  CT GUIDANCE PLACEMENT RAD THERAPY FIELDS: ICD-10-PCS | Mod: 26,,, | Performed by: RADIOLOGY

## 2023-01-27 PROCEDURE — 77014 HC CT GUIDANCE RADIATION THERAPY FLDS PLACEMENT: CPT | Mod: TC | Performed by: RADIOLOGY

## 2023-01-27 PROCEDURE — 77385 HC IMRT, SIMPLE: CPT | Performed by: RADIOLOGY

## 2023-01-27 PROCEDURE — 77014 PR  CT GUIDANCE PLACEMENT RAD THERAPY FIELDS: CPT | Mod: 26,,, | Performed by: RADIOLOGY

## 2023-01-30 PROCEDURE — 77014 HC CT GUIDANCE RADIATION THERAPY FLDS PLACEMENT: CPT | Mod: TC | Performed by: RADIOLOGY

## 2023-01-30 PROCEDURE — 77014 PR  CT GUIDANCE PLACEMENT RAD THERAPY FIELDS: CPT | Mod: 26,,, | Performed by: RADIOLOGY

## 2023-01-30 PROCEDURE — 77385 HC IMRT, SIMPLE: CPT | Performed by: RADIOLOGY

## 2023-01-30 PROCEDURE — 77014 PR  CT GUIDANCE PLACEMENT RAD THERAPY FIELDS: ICD-10-PCS | Mod: 26,,, | Performed by: RADIOLOGY

## 2023-01-31 PROCEDURE — 77014 PR  CT GUIDANCE PLACEMENT RAD THERAPY FIELDS: ICD-10-PCS | Mod: 26,,, | Performed by: RADIOLOGY

## 2023-01-31 PROCEDURE — 77014 HC CT GUIDANCE RADIATION THERAPY FLDS PLACEMENT: CPT | Mod: TC | Performed by: RADIOLOGY

## 2023-01-31 PROCEDURE — 77014 PR  CT GUIDANCE PLACEMENT RAD THERAPY FIELDS: CPT | Mod: 26,,, | Performed by: RADIOLOGY

## 2023-01-31 PROCEDURE — 77385 HC IMRT, SIMPLE: CPT | Performed by: RADIOLOGY

## 2023-01-31 PROCEDURE — 77336 RADIATION PHYSICS CONSULT: CPT | Performed by: RADIOLOGY

## 2023-02-01 ENCOUNTER — HOSPITAL ENCOUNTER (OUTPATIENT)
Dept: RADIATION THERAPY | Facility: HOSPITAL | Age: 68
Discharge: HOME | End: 2023-02-01
Attending: RADIOLOGY
Payer: MEDICARE

## 2023-02-01 ENCOUNTER — DOCUMENTATION ONLY (OUTPATIENT)
Dept: RADIATION ONCOLOGY | Facility: CLINIC | Age: 68
End: 2023-02-01
Payer: MEDICARE

## 2023-02-01 PROCEDURE — 77014 PR  CT GUIDANCE PLACEMENT RAD THERAPY FIELDS: ICD-10-PCS | Mod: 26,,, | Performed by: RADIOLOGY

## 2023-02-01 PROCEDURE — 77385 HC IMRT, SIMPLE: CPT | Performed by: RADIOLOGY

## 2023-02-01 PROCEDURE — 77014 PR  CT GUIDANCE PLACEMENT RAD THERAPY FIELDS: CPT | Mod: 26,,, | Performed by: RADIOLOGY

## 2023-02-01 PROCEDURE — 77014 HC CT GUIDANCE RADIATION THERAPY FLDS PLACEMENT: CPT | Mod: TC | Performed by: RADIOLOGY

## 2023-02-01 NOTE — PLAN OF CARE
Day 6 of outpatient radiation to the prostate bed. Mild burning with urination. Nocturia x 2-3. No diarrhea.

## 2023-02-02 PROCEDURE — 77385 HC IMRT, SIMPLE: CPT | Performed by: RADIOLOGY

## 2023-02-02 PROCEDURE — 77014 PR  CT GUIDANCE PLACEMENT RAD THERAPY FIELDS: CPT | Mod: 26,,, | Performed by: RADIOLOGY

## 2023-02-02 PROCEDURE — 77014 HC CT GUIDANCE RADIATION THERAPY FLDS PLACEMENT: CPT | Mod: TC | Performed by: RADIOLOGY

## 2023-02-02 PROCEDURE — 77014 PR  CT GUIDANCE PLACEMENT RAD THERAPY FIELDS: ICD-10-PCS | Mod: 26,,, | Performed by: RADIOLOGY

## 2023-02-03 PROCEDURE — 77385 HC IMRT, SIMPLE: CPT | Performed by: RADIOLOGY

## 2023-02-03 PROCEDURE — 77014 PR  CT GUIDANCE PLACEMENT RAD THERAPY FIELDS: ICD-10-PCS | Mod: 26,,, | Performed by: RADIOLOGY

## 2023-02-03 PROCEDURE — 77014 HC CT GUIDANCE RADIATION THERAPY FLDS PLACEMENT: CPT | Mod: TC | Performed by: RADIOLOGY

## 2023-02-03 PROCEDURE — 77014 PR  CT GUIDANCE PLACEMENT RAD THERAPY FIELDS: CPT | Mod: 26,,, | Performed by: RADIOLOGY

## 2023-02-06 PROCEDURE — 77014 PR  CT GUIDANCE PLACEMENT RAD THERAPY FIELDS: CPT | Mod: 26,,, | Performed by: RADIOLOGY

## 2023-02-06 PROCEDURE — 77014 HC CT GUIDANCE RADIATION THERAPY FLDS PLACEMENT: CPT | Mod: TC | Performed by: RADIOLOGY

## 2023-02-06 PROCEDURE — 77385 HC IMRT, SIMPLE: CPT | Performed by: RADIOLOGY

## 2023-02-06 PROCEDURE — 77014 PR  CT GUIDANCE PLACEMENT RAD THERAPY FIELDS: ICD-10-PCS | Mod: 26,,, | Performed by: RADIOLOGY

## 2023-02-07 PROCEDURE — 77336 RADIATION PHYSICS CONSULT: CPT | Performed by: RADIOLOGY

## 2023-02-07 PROCEDURE — 77385 HC IMRT, SIMPLE: CPT | Performed by: RADIOLOGY

## 2023-02-07 PROCEDURE — 77014 PR  CT GUIDANCE PLACEMENT RAD THERAPY FIELDS: ICD-10-PCS | Mod: 26,,, | Performed by: RADIOLOGY

## 2023-02-07 PROCEDURE — 77014 PR  CT GUIDANCE PLACEMENT RAD THERAPY FIELDS: CPT | Mod: 26,,, | Performed by: RADIOLOGY

## 2023-02-07 PROCEDURE — 77014 HC CT GUIDANCE RADIATION THERAPY FLDS PLACEMENT: CPT | Mod: TC | Performed by: RADIOLOGY

## 2023-02-08 ENCOUNTER — DOCUMENTATION ONLY (OUTPATIENT)
Dept: RADIATION ONCOLOGY | Facility: CLINIC | Age: 68
End: 2023-02-08
Payer: MEDICARE

## 2023-02-08 PROCEDURE — 77014 PR  CT GUIDANCE PLACEMENT RAD THERAPY FIELDS: ICD-10-PCS | Mod: 26,,, | Performed by: RADIOLOGY

## 2023-02-08 PROCEDURE — 77385 HC IMRT, SIMPLE: CPT | Performed by: RADIOLOGY

## 2023-02-08 PROCEDURE — 77014 PR  CT GUIDANCE PLACEMENT RAD THERAPY FIELDS: CPT | Mod: 26,,, | Performed by: RADIOLOGY

## 2023-02-08 PROCEDURE — 77014 HC CT GUIDANCE RADIATION THERAPY FLDS PLACEMENT: CPT | Mod: TC | Performed by: RADIOLOGY

## 2023-02-08 NOTE — PLAN OF CARE
Day 11 of outpatient radiation to the prostate bed. Denies dysuria, hematuria or burning upon urinating. No stool changes. Nocturia x's 2.

## 2023-02-09 PROCEDURE — 77014 PR  CT GUIDANCE PLACEMENT RAD THERAPY FIELDS: CPT | Mod: 26,,, | Performed by: RADIOLOGY

## 2023-02-09 PROCEDURE — 77014 HC CT GUIDANCE RADIATION THERAPY FLDS PLACEMENT: CPT | Mod: TC | Performed by: RADIOLOGY

## 2023-02-09 PROCEDURE — 77385 HC IMRT, SIMPLE: CPT | Performed by: RADIOLOGY

## 2023-02-09 PROCEDURE — 77014 PR  CT GUIDANCE PLACEMENT RAD THERAPY FIELDS: ICD-10-PCS | Mod: 26,,, | Performed by: RADIOLOGY

## 2023-02-10 PROCEDURE — 77014 PR  CT GUIDANCE PLACEMENT RAD THERAPY FIELDS: ICD-10-PCS | Mod: 26,,, | Performed by: RADIOLOGY

## 2023-02-10 PROCEDURE — 77014 PR  CT GUIDANCE PLACEMENT RAD THERAPY FIELDS: CPT | Mod: 26,,, | Performed by: RADIOLOGY

## 2023-02-10 PROCEDURE — 77385 HC IMRT, SIMPLE: CPT | Performed by: RADIOLOGY

## 2023-02-10 PROCEDURE — 77014 HC CT GUIDANCE RADIATION THERAPY FLDS PLACEMENT: CPT | Mod: TC | Performed by: RADIOLOGY

## 2023-02-13 PROCEDURE — 77385 HC IMRT, SIMPLE: CPT | Performed by: RADIOLOGY

## 2023-02-13 PROCEDURE — 77014 HC CT GUIDANCE RADIATION THERAPY FLDS PLACEMENT: CPT | Mod: TC | Performed by: RADIOLOGY

## 2023-02-13 PROCEDURE — 77014 PR  CT GUIDANCE PLACEMENT RAD THERAPY FIELDS: CPT | Mod: 26,,, | Performed by: RADIOLOGY

## 2023-02-13 PROCEDURE — 77014 PR  CT GUIDANCE PLACEMENT RAD THERAPY FIELDS: ICD-10-PCS | Mod: 26,,, | Performed by: RADIOLOGY

## 2023-02-14 PROCEDURE — 77385 HC IMRT, SIMPLE: CPT | Performed by: RADIOLOGY

## 2023-02-14 PROCEDURE — 77014 PR  CT GUIDANCE PLACEMENT RAD THERAPY FIELDS: ICD-10-PCS | Mod: 26,,, | Performed by: RADIOLOGY

## 2023-02-14 PROCEDURE — 77014 PR  CT GUIDANCE PLACEMENT RAD THERAPY FIELDS: CPT | Mod: 26,,, | Performed by: RADIOLOGY

## 2023-02-14 PROCEDURE — 77014 HC CT GUIDANCE RADIATION THERAPY FLDS PLACEMENT: CPT | Mod: TC | Performed by: RADIOLOGY

## 2023-02-15 ENCOUNTER — DOCUMENTATION ONLY (OUTPATIENT)
Dept: RADIATION ONCOLOGY | Facility: CLINIC | Age: 68
End: 2023-02-15
Payer: MEDICARE

## 2023-02-15 PROCEDURE — 77385 HC IMRT, SIMPLE: CPT | Performed by: RADIOLOGY

## 2023-02-15 PROCEDURE — 77014 PR  CT GUIDANCE PLACEMENT RAD THERAPY FIELDS: CPT | Mod: 26,,, | Performed by: RADIOLOGY

## 2023-02-15 PROCEDURE — 77014 HC CT GUIDANCE RADIATION THERAPY FLDS PLACEMENT: CPT | Mod: TC | Performed by: RADIOLOGY

## 2023-02-15 PROCEDURE — 77014 PR  CT GUIDANCE PLACEMENT RAD THERAPY FIELDS: ICD-10-PCS | Mod: 26,,, | Performed by: RADIOLOGY

## 2023-02-15 NOTE — PLAN OF CARE
Day 16 of outpatient radiation to the prostate bed. Doing well. No dysuria or hematuria. Nocturia x 1-2.

## 2023-02-16 PROCEDURE — 77336 RADIATION PHYSICS CONSULT: CPT | Performed by: RADIOLOGY

## 2023-02-16 PROCEDURE — 77014 PR  CT GUIDANCE PLACEMENT RAD THERAPY FIELDS: ICD-10-PCS | Mod: 26,,, | Performed by: RADIOLOGY

## 2023-02-16 PROCEDURE — 77385 HC IMRT, SIMPLE: CPT | Performed by: RADIOLOGY

## 2023-02-16 PROCEDURE — 77014 HC CT GUIDANCE RADIATION THERAPY FLDS PLACEMENT: CPT | Mod: TC | Performed by: RADIOLOGY

## 2023-02-16 PROCEDURE — 77014 PR  CT GUIDANCE PLACEMENT RAD THERAPY FIELDS: CPT | Mod: 26,,, | Performed by: RADIOLOGY

## 2023-02-17 PROCEDURE — 77385 HC IMRT, SIMPLE: CPT | Performed by: RADIOLOGY

## 2023-02-17 PROCEDURE — 77014 HC CT GUIDANCE RADIATION THERAPY FLDS PLACEMENT: CPT | Mod: TC | Performed by: RADIOLOGY

## 2023-02-17 PROCEDURE — 77014 PR  CT GUIDANCE PLACEMENT RAD THERAPY FIELDS: ICD-10-PCS | Mod: 26,,, | Performed by: RADIOLOGY

## 2023-02-17 PROCEDURE — 77014 PR  CT GUIDANCE PLACEMENT RAD THERAPY FIELDS: CPT | Mod: 26,,, | Performed by: RADIOLOGY

## 2023-02-20 PROCEDURE — 77014 PR  CT GUIDANCE PLACEMENT RAD THERAPY FIELDS: CPT | Mod: 26,,, | Performed by: RADIOLOGY

## 2023-02-20 PROCEDURE — 77014 PR  CT GUIDANCE PLACEMENT RAD THERAPY FIELDS: ICD-10-PCS | Mod: 26,,, | Performed by: RADIOLOGY

## 2023-02-20 PROCEDURE — 77014 HC CT GUIDANCE RADIATION THERAPY FLDS PLACEMENT: CPT | Mod: TC | Performed by: RADIOLOGY

## 2023-02-20 PROCEDURE — 77385 HC IMRT, SIMPLE: CPT | Performed by: RADIOLOGY

## 2023-02-22 ENCOUNTER — DOCUMENTATION ONLY (OUTPATIENT)
Dept: RADIATION ONCOLOGY | Facility: CLINIC | Age: 68
End: 2023-02-22
Payer: MEDICARE

## 2023-02-22 PROCEDURE — 77014 PR  CT GUIDANCE PLACEMENT RAD THERAPY FIELDS: ICD-10-PCS | Mod: 26,,, | Performed by: RADIOLOGY

## 2023-02-22 PROCEDURE — 77014 PR  CT GUIDANCE PLACEMENT RAD THERAPY FIELDS: CPT | Mod: 26,,, | Performed by: RADIOLOGY

## 2023-02-22 PROCEDURE — 77014 HC CT GUIDANCE RADIATION THERAPY FLDS PLACEMENT: CPT | Mod: TC | Performed by: RADIOLOGY

## 2023-02-22 PROCEDURE — 77385 HC IMRT, SIMPLE: CPT | Performed by: RADIOLOGY

## 2023-02-23 PROCEDURE — 77014 PR  CT GUIDANCE PLACEMENT RAD THERAPY FIELDS: ICD-10-PCS | Mod: 26,,, | Performed by: RADIOLOGY

## 2023-02-23 PROCEDURE — 77014 HC CT GUIDANCE RADIATION THERAPY FLDS PLACEMENT: CPT | Mod: TC | Performed by: RADIOLOGY

## 2023-02-23 PROCEDURE — 77385 HC IMRT, SIMPLE: CPT | Performed by: RADIOLOGY

## 2023-02-23 PROCEDURE — 77014 PR  CT GUIDANCE PLACEMENT RAD THERAPY FIELDS: CPT | Mod: 26,,, | Performed by: RADIOLOGY

## 2023-02-23 NOTE — PLAN OF CARE
Day 20 of outpatient radiation to prostate bed. No dysuria or hematuria. Some urinary urgency. No diarrhea.

## 2023-02-24 PROCEDURE — 77014 PR  CT GUIDANCE PLACEMENT RAD THERAPY FIELDS: ICD-10-PCS | Mod: 26,,, | Performed by: RADIOLOGY

## 2023-02-24 PROCEDURE — 77014 HC CT GUIDANCE RADIATION THERAPY FLDS PLACEMENT: CPT | Mod: TC | Performed by: RADIOLOGY

## 2023-02-24 PROCEDURE — 77385 HC IMRT, SIMPLE: CPT | Performed by: RADIOLOGY

## 2023-02-24 PROCEDURE — 77336 RADIATION PHYSICS CONSULT: CPT | Performed by: RADIOLOGY

## 2023-02-24 PROCEDURE — 77014 PR  CT GUIDANCE PLACEMENT RAD THERAPY FIELDS: CPT | Mod: 26,,, | Performed by: RADIOLOGY

## 2023-02-27 PROCEDURE — 77014 PR  CT GUIDANCE PLACEMENT RAD THERAPY FIELDS: CPT | Mod: 26,,, | Performed by: RADIOLOGY

## 2023-02-27 PROCEDURE — 77385 HC IMRT, SIMPLE: CPT | Performed by: RADIOLOGY

## 2023-02-27 PROCEDURE — 77014 PR  CT GUIDANCE PLACEMENT RAD THERAPY FIELDS: ICD-10-PCS | Mod: 26,,, | Performed by: RADIOLOGY

## 2023-02-27 PROCEDURE — 77014 HC CT GUIDANCE RADIATION THERAPY FLDS PLACEMENT: CPT | Mod: TC | Performed by: RADIOLOGY

## 2023-02-28 PROCEDURE — 77385 HC IMRT, SIMPLE: CPT | Performed by: RADIOLOGY

## 2023-02-28 PROCEDURE — 77014 PR  CT GUIDANCE PLACEMENT RAD THERAPY FIELDS: CPT | Mod: 26,,, | Performed by: RADIOLOGY

## 2023-02-28 PROCEDURE — 77014 HC CT GUIDANCE RADIATION THERAPY FLDS PLACEMENT: CPT | Mod: TC | Performed by: RADIOLOGY

## 2023-02-28 PROCEDURE — 77014 PR  CT GUIDANCE PLACEMENT RAD THERAPY FIELDS: ICD-10-PCS | Mod: 26,,, | Performed by: RADIOLOGY

## 2023-03-01 ENCOUNTER — DOCUMENTATION ONLY (OUTPATIENT)
Dept: RADIATION ONCOLOGY | Facility: CLINIC | Age: 68
End: 2023-03-01
Payer: MEDICARE

## 2023-03-01 ENCOUNTER — HOSPITAL ENCOUNTER (OUTPATIENT)
Dept: RADIATION THERAPY | Facility: HOSPITAL | Age: 68
Discharge: HOME | End: 2023-03-01
Attending: RADIOLOGY
Payer: MEDICARE

## 2023-03-01 PROCEDURE — 77014 HC CT GUIDANCE RADIATION THERAPY FLDS PLACEMENT: CPT | Mod: TC | Performed by: RADIOLOGY

## 2023-03-01 PROCEDURE — 77014 PR  CT GUIDANCE PLACEMENT RAD THERAPY FIELDS: CPT | Mod: 26,,, | Performed by: RADIOLOGY

## 2023-03-01 PROCEDURE — 77385 HC IMRT, SIMPLE: CPT | Performed by: RADIOLOGY

## 2023-03-01 PROCEDURE — 77014 PR  CT GUIDANCE PLACEMENT RAD THERAPY FIELDS: ICD-10-PCS | Mod: 26,,, | Performed by: RADIOLOGY

## 2023-03-01 NOTE — PLAN OF CARE
Completed 25 of outpatient radiation to the prostate bed. No dysuria or hematuria . No fatigue. Some diarrhea.

## 2023-03-02 PROCEDURE — 77338 PR  MLC IMRT DESIGN & CONSTRUCTION PER IMRT PLAN: ICD-10-PCS | Mod: 26,,, | Performed by: RADIOLOGY

## 2023-03-02 PROCEDURE — 77338 DESIGN MLC DEVICE FOR IMRT: CPT | Mod: TC | Performed by: RADIOLOGY

## 2023-03-02 PROCEDURE — 77014 PR  CT GUIDANCE PLACEMENT RAD THERAPY FIELDS: ICD-10-PCS | Mod: 26,,, | Performed by: RADIOLOGY

## 2023-03-02 PROCEDURE — 77300 RADIATION THERAPY DOSE PLAN: CPT | Mod: TC | Performed by: RADIOLOGY

## 2023-03-02 PROCEDURE — 77338 DESIGN MLC DEVICE FOR IMRT: CPT | Mod: 26,,, | Performed by: RADIOLOGY

## 2023-03-02 PROCEDURE — 77014 HC CT GUIDANCE RADIATION THERAPY FLDS PLACEMENT: CPT | Mod: TC | Performed by: RADIOLOGY

## 2023-03-02 PROCEDURE — 77300 RADIATION THERAPY DOSE PLAN: CPT | Mod: 26,,, | Performed by: RADIOLOGY

## 2023-03-02 PROCEDURE — 77300 PR RADIATION THERAPY,DOSIMETRY PLAN: ICD-10-PCS | Mod: 26,,, | Performed by: RADIOLOGY

## 2023-03-02 PROCEDURE — 77014 PR  CT GUIDANCE PLACEMENT RAD THERAPY FIELDS: CPT | Mod: 26,,, | Performed by: RADIOLOGY

## 2023-03-02 PROCEDURE — 77385 HC IMRT, SIMPLE: CPT | Performed by: RADIOLOGY

## 2023-03-03 PROCEDURE — 77014 HC CT GUIDANCE RADIATION THERAPY FLDS PLACEMENT: CPT | Mod: TC | Performed by: RADIOLOGY

## 2023-03-03 PROCEDURE — 77385 HC IMRT, SIMPLE: CPT | Performed by: RADIOLOGY

## 2023-03-03 PROCEDURE — 77014 PR  CT GUIDANCE PLACEMENT RAD THERAPY FIELDS: ICD-10-PCS | Mod: 26,,, | Performed by: RADIOLOGY

## 2023-03-03 PROCEDURE — 77014 PR  CT GUIDANCE PLACEMENT RAD THERAPY FIELDS: CPT | Mod: 26,,, | Performed by: RADIOLOGY

## 2023-03-03 PROCEDURE — 77336 RADIATION PHYSICS CONSULT: CPT | Performed by: RADIOLOGY

## 2023-03-06 PROCEDURE — 77014 PR  CT GUIDANCE PLACEMENT RAD THERAPY FIELDS: ICD-10-PCS | Mod: 26,,, | Performed by: RADIOLOGY

## 2023-03-06 PROCEDURE — 77014 PR  CT GUIDANCE PLACEMENT RAD THERAPY FIELDS: CPT | Mod: 26,,, | Performed by: RADIOLOGY

## 2023-03-06 PROCEDURE — 77014 HC CT GUIDANCE RADIATION THERAPY FLDS PLACEMENT: CPT | Mod: TC | Performed by: RADIOLOGY

## 2023-03-06 PROCEDURE — 77385 HC IMRT, SIMPLE: CPT | Performed by: RADIOLOGY

## 2023-03-07 PROCEDURE — 77014 HC CT GUIDANCE RADIATION THERAPY FLDS PLACEMENT: CPT | Mod: TC | Performed by: RADIOLOGY

## 2023-03-07 PROCEDURE — 77014 PR  CT GUIDANCE PLACEMENT RAD THERAPY FIELDS: ICD-10-PCS | Mod: 26,,, | Performed by: RADIOLOGY

## 2023-03-07 PROCEDURE — 77014 PR  CT GUIDANCE PLACEMENT RAD THERAPY FIELDS: CPT | Mod: 26,,, | Performed by: RADIOLOGY

## 2023-03-07 PROCEDURE — 77385 HC IMRT, SIMPLE: CPT | Performed by: RADIOLOGY

## 2023-03-08 ENCOUNTER — DOCUMENTATION ONLY (OUTPATIENT)
Dept: RADIATION ONCOLOGY | Facility: CLINIC | Age: 68
End: 2023-03-08
Payer: MEDICARE

## 2023-03-08 PROCEDURE — 77014 PR  CT GUIDANCE PLACEMENT RAD THERAPY FIELDS: ICD-10-PCS | Mod: 26,,, | Performed by: RADIOLOGY

## 2023-03-08 PROCEDURE — 77014 HC CT GUIDANCE RADIATION THERAPY FLDS PLACEMENT: CPT | Mod: TC | Performed by: RADIOLOGY

## 2023-03-08 PROCEDURE — 77385 HC IMRT, SIMPLE: CPT | Performed by: RADIOLOGY

## 2023-03-08 PROCEDURE — 77014 PR  CT GUIDANCE PLACEMENT RAD THERAPY FIELDS: CPT | Mod: 26,,, | Performed by: RADIOLOGY

## 2023-03-08 NOTE — PLAN OF CARE
Day 30 of outpatient radiation to prostate bed. Doing well. No dysuria or hematuria. Nocturia x 1. No diarrhea.

## 2023-03-09 PROCEDURE — 77014 PR  CT GUIDANCE PLACEMENT RAD THERAPY FIELDS: ICD-10-PCS | Mod: 26,,, | Performed by: RADIOLOGY

## 2023-03-09 PROCEDURE — 77385 HC IMRT, SIMPLE: CPT | Performed by: RADIOLOGY

## 2023-03-09 PROCEDURE — 77014 HC CT GUIDANCE RADIATION THERAPY FLDS PLACEMENT: CPT | Mod: TC | Performed by: RADIOLOGY

## 2023-03-09 PROCEDURE — 77014 PR  CT GUIDANCE PLACEMENT RAD THERAPY FIELDS: CPT | Mod: 26,,, | Performed by: RADIOLOGY

## 2023-03-10 PROCEDURE — 77014 PR  CT GUIDANCE PLACEMENT RAD THERAPY FIELDS: CPT | Mod: 26,,, | Performed by: RADIOLOGY

## 2023-03-10 PROCEDURE — 77014 HC CT GUIDANCE RADIATION THERAPY FLDS PLACEMENT: CPT | Mod: TC | Performed by: RADIOLOGY

## 2023-03-10 PROCEDURE — 77014 PR  CT GUIDANCE PLACEMENT RAD THERAPY FIELDS: ICD-10-PCS | Mod: 26,,, | Performed by: RADIOLOGY

## 2023-03-10 PROCEDURE — 77385 HC IMRT, SIMPLE: CPT | Performed by: RADIOLOGY

## 2023-03-10 PROCEDURE — 77336 RADIATION PHYSICS CONSULT: CPT | Performed by: RADIOLOGY

## 2023-03-13 PROCEDURE — 77014 PR  CT GUIDANCE PLACEMENT RAD THERAPY FIELDS: CPT | Mod: 26,,, | Performed by: RADIOLOGY

## 2023-03-13 PROCEDURE — 77014 HC CT GUIDANCE RADIATION THERAPY FLDS PLACEMENT: CPT | Mod: TC | Performed by: RADIOLOGY

## 2023-03-13 PROCEDURE — 77385 HC IMRT, SIMPLE: CPT | Performed by: RADIOLOGY

## 2023-03-13 PROCEDURE — 77014 PR  CT GUIDANCE PLACEMENT RAD THERAPY FIELDS: ICD-10-PCS | Mod: 26,,, | Performed by: RADIOLOGY

## 2023-03-15 ENCOUNTER — DOCUMENTATION ONLY (OUTPATIENT)
Dept: RADIATION ONCOLOGY | Facility: CLINIC | Age: 68
End: 2023-03-15
Payer: MEDICARE

## 2023-03-15 PROCEDURE — 77014 PR  CT GUIDANCE PLACEMENT RAD THERAPY FIELDS: CPT | Mod: 26,,, | Performed by: RADIOLOGY

## 2023-03-15 PROCEDURE — 77014 HC CT GUIDANCE RADIATION THERAPY FLDS PLACEMENT: CPT | Mod: TC | Performed by: RADIOLOGY

## 2023-03-15 PROCEDURE — 77385 HC IMRT, SIMPLE: CPT | Performed by: RADIOLOGY

## 2023-03-15 PROCEDURE — 77014 PR  CT GUIDANCE PLACEMENT RAD THERAPY FIELDS: ICD-10-PCS | Mod: 26,,, | Performed by: RADIOLOGY

## 2023-03-15 NOTE — PLAN OF CARE
Day 34 of outpatient radiation to the prostate bed. Doing well. No dysuria or hematuria. Nocturia x 2. No diarrhea.

## 2023-03-16 PROCEDURE — 77385 HC IMRT, SIMPLE: CPT | Performed by: RADIOLOGY

## 2023-03-16 PROCEDURE — 77386 HC IMRT, COMPLEX: CPT | Performed by: RADIOLOGY

## 2023-03-17 PROCEDURE — 77014 HC CT GUIDANCE RADIATION THERAPY FLDS PLACEMENT: CPT | Mod: TC | Performed by: RADIOLOGY

## 2023-03-17 PROCEDURE — 77385 HC IMRT, SIMPLE: CPT | Performed by: RADIOLOGY

## 2023-03-17 PROCEDURE — 77014 PR  CT GUIDANCE PLACEMENT RAD THERAPY FIELDS: CPT | Mod: 26,,, | Performed by: RADIOLOGY

## 2023-03-17 PROCEDURE — 77014 PR  CT GUIDANCE PLACEMENT RAD THERAPY FIELDS: ICD-10-PCS | Mod: 26,,, | Performed by: RADIOLOGY

## 2023-03-20 PROCEDURE — 77014 HC CT GUIDANCE RADIATION THERAPY FLDS PLACEMENT: CPT | Mod: TC | Performed by: RADIOLOGY

## 2023-03-20 PROCEDURE — 77336 RADIATION PHYSICS CONSULT: CPT | Performed by: RADIOLOGY

## 2023-03-20 PROCEDURE — 77385 HC IMRT, SIMPLE: CPT | Performed by: RADIOLOGY

## 2023-03-20 PROCEDURE — 77014 PR  CT GUIDANCE PLACEMENT RAD THERAPY FIELDS: CPT | Mod: 26,,, | Performed by: RADIOLOGY

## 2023-03-20 PROCEDURE — 77014 PR  CT GUIDANCE PLACEMENT RAD THERAPY FIELDS: ICD-10-PCS | Mod: 26,,, | Performed by: RADIOLOGY

## 2023-03-21 PROCEDURE — 77014 HC CT GUIDANCE RADIATION THERAPY FLDS PLACEMENT: CPT | Mod: TC | Performed by: RADIOLOGY

## 2023-03-21 PROCEDURE — 77014 PR  CT GUIDANCE PLACEMENT RAD THERAPY FIELDS: ICD-10-PCS | Mod: 26,,, | Performed by: RADIOLOGY

## 2023-03-21 PROCEDURE — 77385 HC IMRT, SIMPLE: CPT | Performed by: RADIOLOGY

## 2023-03-21 PROCEDURE — 77014 PR  CT GUIDANCE PLACEMENT RAD THERAPY FIELDS: CPT | Mod: 26,,, | Performed by: RADIOLOGY

## 2023-03-22 ENCOUNTER — DOCUMENTATION ONLY (OUTPATIENT)
Dept: RADIATION ONCOLOGY | Facility: CLINIC | Age: 68
End: 2023-03-22
Payer: MEDICARE

## 2023-03-22 NOTE — PLAN OF CARE
Completed 38/38 of outpatient radiation to prostate bed. Tolerated therapy well. RTC 2-3 mos w/psa.

## 2023-04-03 DIAGNOSIS — C61 PROSTATE CANCER: Primary | ICD-10-CM

## 2023-07-14 ENCOUNTER — LAB VISIT (OUTPATIENT)
Dept: LAB | Facility: HOSPITAL | Age: 68
End: 2023-07-14
Attending: RADIOLOGY
Payer: MEDICARE

## 2023-07-14 ENCOUNTER — OFFICE VISIT (OUTPATIENT)
Dept: RADIATION ONCOLOGY | Facility: CLINIC | Age: 68
End: 2023-07-14
Payer: MEDICARE

## 2023-07-14 VITALS
BODY MASS INDEX: 30.03 KG/M2 | WEIGHT: 214.5 LBS | HEART RATE: 54 BPM | HEIGHT: 71 IN | DIASTOLIC BLOOD PRESSURE: 82 MMHG | RESPIRATION RATE: 16 BRPM | SYSTOLIC BLOOD PRESSURE: 167 MMHG

## 2023-07-14 DIAGNOSIS — C61 PROSTATE CANCER: ICD-10-CM

## 2023-07-14 DIAGNOSIS — C61 PROSTATE CANCER: Primary | ICD-10-CM

## 2023-07-14 LAB — COMPLEXED PSA SERPL-MCNC: <0.01 NG/ML (ref 0–4)

## 2023-07-14 PROCEDURE — 99999 PR PBB SHADOW E&M-EST. PATIENT-LVL III: CPT | Mod: PBBFAC,,, | Performed by: RADIOLOGY

## 2023-07-14 PROCEDURE — 99499 NO LOS: ICD-10-PCS | Mod: S$GLB,,, | Performed by: RADIOLOGY

## 2023-07-14 PROCEDURE — 36415 COLL VENOUS BLD VENIPUNCTURE: CPT | Performed by: RADIOLOGY

## 2023-07-14 PROCEDURE — 99499 UNLISTED E&M SERVICE: CPT | Mod: S$GLB,,, | Performed by: RADIOLOGY

## 2023-07-14 PROCEDURE — 99999 PR PBB SHADOW E&M-EST. PATIENT-LVL III: ICD-10-PCS | Mod: PBBFAC,,, | Performed by: RADIOLOGY

## 2023-07-14 PROCEDURE — 84153 ASSAY OF PSA TOTAL: CPT | Performed by: RADIOLOGY

## 2023-07-14 NOTE — PROGRESS NOTES
Patient ID: Bert Knott is a 68 y.o. male.    Chief Complaint: Prostate Cancer (F/u after xrt;psa)    This patient presents for his initial follow up visit.     Mr. Knott has a history of pathologic Stage IIIB, (pT3a, N0, M0, P<10, G1) adenocarcinoma of the prostate.  He presented in 2017 with an elevated PSA of 6.7 ng/ml. Biopsies revealed [place] 7 (3+4) adenocarcinoma involving the Rt. mid gland and Rt. apex. He subsequently underwent RALP with bilateral node dissection in August of 2017. Pathology revealed [place] 6 (3+3) adenocarcinoma with a tertiary pattern H[place] 4. There was evidence of extraprostatic extension and perineural invasion. There was no seminal vesicle or bladder neck invasion. Postoperative PSA remained < 0.01 ng/ml until February of 2021 when it returned at 0.07 ng/ml. Repeat PSA on 9/27/22 returned at 0.32 ng/ml. PSMA scan on 11/2/22 revealed postoperative changes with faint focal activity at the vesicourethral anastomosis. The patient was referred for salvage therapy and received a 6 month Lupron injection followed by radiotherapy to the prostate bed and pelvic nodes completed in March of 2023.  Today the patient states he feels well.      Review of Systems   Constitutional:  Negative for activity change, appetite change, chills and fatigue.   Respiratory:  Negative for cough and shortness of breath.    Gastrointestinal:  Negative for change in bowel habit, constipation, diarrhea and change in bowel habit.   Genitourinary:  Positive for bladder incontinence (mild stress incontinence). Negative for difficulty urinating, dysuria, frequency and hematuria.     Physical Exam  Constitutional:       General: He is not in acute distress.     Appearance: Normal appearance.   Pulmonary:      Effort: Pulmonary effort is normal. No respiratory distress.   Abdominal:      General: Abdomen is flat. There is no distension.   Neurological:      Mental Status: He is alert and oriented to person,  place, and time.   Psychiatric:         Mood and Affect: Mood normal.         Judgment: Judgment normal.     PSA < 0.01 ng/ml    1. Prostate cancer      Doing well, no evidence of tumor progression.  Encouraged to restart Kegel exercises.  Plan follow up in 6 months with PSA and testosterone.

## 2024-01-23 ENCOUNTER — LAB VISIT (OUTPATIENT)
Dept: LAB | Facility: HOSPITAL | Age: 69
End: 2024-01-23
Attending: RADIOLOGY
Payer: MEDICARE

## 2024-01-23 ENCOUNTER — OFFICE VISIT (OUTPATIENT)
Dept: RADIATION ONCOLOGY | Facility: CLINIC | Age: 69
End: 2024-01-23
Payer: MEDICARE

## 2024-01-23 VITALS
RESPIRATION RATE: 16 BRPM | WEIGHT: 205.81 LBS | DIASTOLIC BLOOD PRESSURE: 81 MMHG | SYSTOLIC BLOOD PRESSURE: 168 MMHG | HEART RATE: 56 BPM | BODY MASS INDEX: 28.81 KG/M2 | HEIGHT: 71 IN

## 2024-01-23 DIAGNOSIS — C61 PROSTATE CANCER: ICD-10-CM

## 2024-01-23 DIAGNOSIS — C61 PROSTATE CANCER: Primary | ICD-10-CM

## 2024-01-23 LAB
COMPLEXED PSA SERPL-MCNC: 0.02 NG/ML (ref 0–4)
TESTOST SERPL-MCNC: 514 NG/DL (ref 304–1227)

## 2024-01-23 PROCEDURE — 3008F BODY MASS INDEX DOCD: CPT | Mod: CPTII,S$GLB,, | Performed by: RADIOLOGY

## 2024-01-23 PROCEDURE — 1101F PT FALLS ASSESS-DOCD LE1/YR: CPT | Mod: CPTII,S$GLB,, | Performed by: RADIOLOGY

## 2024-01-23 PROCEDURE — 3288F FALL RISK ASSESSMENT DOCD: CPT | Mod: CPTII,S$GLB,, | Performed by: RADIOLOGY

## 2024-01-23 PROCEDURE — 3079F DIAST BP 80-89 MM HG: CPT | Mod: CPTII,S$GLB,, | Performed by: RADIOLOGY

## 2024-01-23 PROCEDURE — 84153 ASSAY OF PSA TOTAL: CPT | Performed by: RADIOLOGY

## 2024-01-23 PROCEDURE — 99212 OFFICE O/P EST SF 10 MIN: CPT | Mod: S$GLB,,, | Performed by: RADIOLOGY

## 2024-01-23 PROCEDURE — 1160F RVW MEDS BY RX/DR IN RCRD: CPT | Mod: CPTII,S$GLB,, | Performed by: RADIOLOGY

## 2024-01-23 PROCEDURE — 1159F MED LIST DOCD IN RCRD: CPT | Mod: CPTII,S$GLB,, | Performed by: RADIOLOGY

## 2024-01-23 PROCEDURE — 36415 COLL VENOUS BLD VENIPUNCTURE: CPT | Performed by: RADIOLOGY

## 2024-01-23 PROCEDURE — 84403 ASSAY OF TOTAL TESTOSTERONE: CPT | Performed by: RADIOLOGY

## 2024-01-23 PROCEDURE — 3077F SYST BP >= 140 MM HG: CPT | Mod: CPTII,S$GLB,, | Performed by: RADIOLOGY

## 2024-01-23 PROCEDURE — 99999 PR PBB SHADOW E&M-EST. PATIENT-LVL IV: CPT | Mod: PBBFAC,,, | Performed by: RADIOLOGY

## 2024-01-23 PROCEDURE — 1126F AMNT PAIN NOTED NONE PRSNT: CPT | Mod: CPTII,S$GLB,, | Performed by: RADIOLOGY

## 2024-01-23 RX ORDER — QUETIAPINE FUMARATE 100 MG/1
200 TABLET, FILM COATED ORAL NIGHTLY
COMMUNITY
Start: 2023-11-13

## 2024-01-23 NOTE — PROGRESS NOTES
Multidisciplinary Uro-Oncology Clinic  [facility] - Radiation Oncology     Patient ID: Bert Knott is a 68 y.o. male.    Chief Complaint: Prostate Cancer (6 mo f/u;psa)    Mr. Knott has a history of pathologic Stage IIIB, (pT3a, N0, M0, P<10, G1) adenocarcinoma of the prostate.  He presented in 2017 with an elevated PSA of 6.7 ng/ml. Biopsies revealed [place] 7 (3+4) adenocarcinoma involving the Rt. mid gland and Rt. apex. He subsequently underwent RALP with bilateral node dissection in August of 2017. Pathology revealed Hira 6 (3+3) adenocarcinoma with a tertiary pattern [place] 4. There was evidence of extraprostatic extension and perineural invasion. There was no seminal vesicle or bladder neck invasion. Postoperative PSA remained < 0.01 ng/ml until February of 2021 when it returned at 0.07 ng/ml. Repeat PSA on 9/27/22 returned at 0.32 ng/ml. PSMA scan on 11/2/22 revealed postoperative changes with faint focal activity at the vesicourethral anastomosis. The patient was referred for salvage therapy and received a 6 month Lupron injection followed by radiotherapy to the prostate bed and pelvic nodes completed in March of 2023.  He has remained CHAVEZ since that time.       Review of Systems   Constitutional:  Negative for activity change, appetite change, chills and fatigue.   Gastrointestinal:  Negative for abdominal pain, constipation, diarrhea and fecal incontinence.   Genitourinary:  Positive for bladder incontinence (enuresis and some stress incontinence.) and erectile dysfunction. Negative for difficulty urinating, dysuria, frequency and hematuria.     Physical Exam  Constitutional:       General: He is not in acute distress.     Appearance: Normal appearance.   Abdominal:      General: Abdomen is flat. There is no distension.   Neurological:      Mental Status: He is alert and oriented to person, place, and time.   Psychiatric:         Mood and Affect: Mood normal.          Judgment: Judgment normal.       PSA and testosterone.     Assessment and Plan   Prostate cancer - doing well.  Await PSA and testosterone.  Will refer to PT for evaluation of incontinence. Plan follow up in 6 months with psa and testosterone.

## 2024-02-06 DIAGNOSIS — C61 PROSTATE CANCER: Primary | ICD-10-CM

## 2024-05-02 ENCOUNTER — LAB VISIT (OUTPATIENT)
Dept: LAB | Facility: HOSPITAL | Age: 69
End: 2024-05-02
Payer: MEDICARE

## 2024-05-02 ENCOUNTER — OFFICE VISIT (OUTPATIENT)
Dept: RADIATION ONCOLOGY | Facility: CLINIC | Age: 69
End: 2024-05-02
Payer: MEDICARE

## 2024-05-02 VITALS
BODY MASS INDEX: 30.19 KG/M2 | DIASTOLIC BLOOD PRESSURE: 93 MMHG | WEIGHT: 215.63 LBS | SYSTOLIC BLOOD PRESSURE: 135 MMHG | HEIGHT: 71 IN | HEART RATE: 53 BPM | OXYGEN SATURATION: 97 %

## 2024-05-02 DIAGNOSIS — C61 PROSTATE CANCER: ICD-10-CM

## 2024-05-02 DIAGNOSIS — C61 PROSTATE CANCER: Primary | ICD-10-CM

## 2024-05-02 LAB — COMPLEXED PSA SERPL-MCNC: 0.04 NG/ML (ref 0–4)

## 2024-05-02 PROCEDURE — 99999 PR PBB SHADOW E&M-EST. PATIENT-LVL III: CPT | Mod: PBBFAC,,, | Performed by: RADIOLOGY

## 2024-05-02 PROCEDURE — 1101F PT FALLS ASSESS-DOCD LE1/YR: CPT | Mod: CPTII,S$GLB,, | Performed by: RADIOLOGY

## 2024-05-02 PROCEDURE — 3080F DIAST BP >= 90 MM HG: CPT | Mod: CPTII,S$GLB,, | Performed by: RADIOLOGY

## 2024-05-02 PROCEDURE — 3075F SYST BP GE 130 - 139MM HG: CPT | Mod: CPTII,S$GLB,, | Performed by: RADIOLOGY

## 2024-05-02 PROCEDURE — 3288F FALL RISK ASSESSMENT DOCD: CPT | Mod: CPTII,S$GLB,, | Performed by: RADIOLOGY

## 2024-05-02 PROCEDURE — 3008F BODY MASS INDEX DOCD: CPT | Mod: CPTII,S$GLB,, | Performed by: RADIOLOGY

## 2024-05-02 PROCEDURE — 1160F RVW MEDS BY RX/DR IN RCRD: CPT | Mod: CPTII,S$GLB,, | Performed by: RADIOLOGY

## 2024-05-02 PROCEDURE — 1126F AMNT PAIN NOTED NONE PRSNT: CPT | Mod: CPTII,S$GLB,, | Performed by: RADIOLOGY

## 2024-05-02 PROCEDURE — 1159F MED LIST DOCD IN RCRD: CPT | Mod: CPTII,S$GLB,, | Performed by: RADIOLOGY

## 2024-05-02 PROCEDURE — 36415 COLL VENOUS BLD VENIPUNCTURE: CPT | Performed by: RADIOLOGY

## 2024-05-02 PROCEDURE — 84153 ASSAY OF PSA TOTAL: CPT | Performed by: RADIOLOGY

## 2024-05-02 PROCEDURE — 99212 OFFICE O/P EST SF 10 MIN: CPT | Mod: S$GLB,,, | Performed by: RADIOLOGY

## 2024-05-02 NOTE — PROGRESS NOTES
[facility] - Radiation Oncology     Patient ID: Bert Knott is a 69 y.o. male.    Chief Complaint: Follow-up       Mr. Knott has a history of pathologic Stage IIIB, (pT3a, N0, M0, P<10, G1) adenocarcinoma of the prostate.  He presented in 2017 with an elevated PSA of 6.7 ng/ml. Biopsies revealed Hira 7 (3+4) adenocarcinoma involving the Rt. mid gland and Rt. apex. He subsequently underwent RALP with bilateral node dissection in August of 2017. Pathology revealed Lagrange 6 (3+3) adenocarcinoma with a tertiary pattern Lagrange 4. There was evidence of extraprostatic extension and perineural invasion. There was no seminal vesicle or bladder neck invasion. Postoperative PSA remained < 0.01 ng/ml until February of 2021 when it returned at 0.07 ng/ml. Repeat PSA on 9/27/22 returned at 0.32 ng/ml. PSMA scan on 11/2/22 revealed postoperative changes with faint focal activity at the vesicourethral anastomosis. The patient was referred for salvage therapy and received a 6 month Lupron injection followed by radiotherapy to the prostate bed and pelvic nodes completed in March of 2023.  He returns for follow up.        Review of Systems   Constitutional:  Negative for activity change, appetite change and fatigue.   Gastrointestinal:  Negative for abdominal pain, constipation and diarrhea.   Genitourinary:  Negative for bladder incontinence, difficulty urinating, dysuria, frequency and hematuria.       Physical Exam  Constitutional:       General: He is not in acute distress.  Abdominal:      General: Abdomen is flat. There is no distension.   Neurological:      Mental Status: He is alert and oriented to person, place, and time.   Psychiatric:         Mood and Affect: Mood normal.         Judgment: Judgment normal.     PSA - pending       Assessment and Plan    Prostate cancer - await his PSA.  Plan follow up in 4 months with PSA

## 2024-09-05 ENCOUNTER — OFFICE VISIT (OUTPATIENT)
Dept: RADIATION ONCOLOGY | Facility: CLINIC | Age: 69
End: 2024-09-05
Payer: MEDICARE

## 2024-09-05 ENCOUNTER — LAB VISIT (OUTPATIENT)
Dept: LAB | Facility: HOSPITAL | Age: 69
End: 2024-09-05
Attending: RADIOLOGY
Payer: MEDICARE

## 2024-09-05 VITALS
HEART RATE: 66 BPM | SYSTOLIC BLOOD PRESSURE: 146 MMHG | RESPIRATION RATE: 16 BRPM | BODY MASS INDEX: 30.86 KG/M2 | WEIGHT: 220.44 LBS | HEIGHT: 71 IN | DIASTOLIC BLOOD PRESSURE: 93 MMHG

## 2024-09-05 DIAGNOSIS — C61 PROSTATE CANCER: ICD-10-CM

## 2024-09-05 DIAGNOSIS — C61 PROSTATE CANCER: Primary | ICD-10-CM

## 2024-09-05 LAB — COMPLEXED PSA SERPL-MCNC: 0.13 NG/ML (ref 0–4)

## 2024-09-05 PROCEDURE — 99999 PR PBB SHADOW E&M-EST. PATIENT-LVL III: CPT | Mod: PBBFAC,,, | Performed by: RADIOLOGY

## 2024-09-05 PROCEDURE — 84153 ASSAY OF PSA TOTAL: CPT | Performed by: RADIOLOGY

## 2024-09-05 PROCEDURE — 36415 COLL VENOUS BLD VENIPUNCTURE: CPT | Performed by: RADIOLOGY

## 2024-09-05 NOTE — PROGRESS NOTES
[facility] - Radiation Oncology     Patient ID: Bert Knott is a 69 y.o. male.    Chief Complaint: Prostate Cancer (4 mo f/u;psa)    Mr. Knott has a history of pathologic Stage IIIB, (pT3a, N0, M0, P<10, G1) adenocarcinoma of the prostate.  He presented in 2017 with an elevated PSA of 6.7 ng/ml. Biopsies revealed Hira 7 (3+4) adenocarcinoma involving the Rt. mid gland and Rt. apex. He subsequently underwent RALP with bilateral node dissection in August of 2017. Pathology revealed [place] 6 (3+3) adenocarcinoma with a tertiary pattern Hira 4. There was evidence of extraprostatic extension and perineural invasion. There was no seminal vesicle or bladder neck invasion. Postoperative PSA remained < 0.01 ng/ml until February of 2021 when it returned at 0.07 ng/ml. Repeat PSA on 9/27/22 returned at 0.32 ng/ml. PSMA scan on 11/2/22 revealed postoperative changes with faint focal activity at the vesicourethral anastomosis. The patient was referred for salvage therapy and received a 6 month Lupron injection followed by radiotherapy to the prostate bed and pelvic nodes completed in March of 2023.  Post therapy his PSA has been increasing.  Today the patient states he feels well.  No complaints.       Review of Systems   Constitutional:  Negative for activity change, appetite change, chills and fatigue.   Gastrointestinal:  Negative for constipation, diarrhea and fecal incontinence.   Genitourinary:  Negative for bladder incontinence, difficulty urinating, dysuria, frequency and hematuria.       Physical Exam  Constitutional:       General: He is not in acute distress.     Appearance: Normal appearance.   Neurological:      Mental Status: He is alert and oriented to person, place, and time.   Psychiatric:         Mood and Affect: Mood normal.         Judgment: Judgment normal.        Latest Reference Range & Units 01/23/24 11:14 05/02/24 09:09 09/05/24 10:13   PSA Diagnostic 0.00 - 4.00  ng/mL 0.02 0.04 0.13        Assessment and Plan    recurrent prostate cancer - PSA continues to increase Doubling time ~ 2.5 months.  Discussed PSA results.  Will plan follow up in December with PSA and PSMA scan .

## 2024-12-04 ENCOUNTER — HOSPITAL ENCOUNTER (OUTPATIENT)
Dept: RADIOLOGY | Facility: HOSPITAL | Age: 69
Discharge: HOME | End: 2024-12-04
Attending: RADIOLOGY
Payer: MEDICARE

## 2024-12-04 ENCOUNTER — OFFICE VISIT (OUTPATIENT)
Dept: RADIATION ONCOLOGY | Facility: CLINIC | Age: 69
End: 2024-12-04
Payer: MEDICARE

## 2024-12-04 VITALS
RESPIRATION RATE: 16 BRPM | OXYGEN SATURATION: 99 % | SYSTOLIC BLOOD PRESSURE: 168 MMHG | BODY MASS INDEX: 31.45 KG/M2 | DIASTOLIC BLOOD PRESSURE: 90 MMHG | WEIGHT: 224.63 LBS | HEART RATE: 52 BPM | HEIGHT: 71 IN

## 2024-12-04 DIAGNOSIS — C61 PROSTATE CANCER: Primary | ICD-10-CM

## 2024-12-04 DIAGNOSIS — C61 PROSTATE CANCER: ICD-10-CM

## 2024-12-04 PROCEDURE — 99999 PR PBB SHADOW E&M-EST. PATIENT-LVL III: CPT | Mod: PBBFAC,,, | Performed by: RADIOLOGY

## 2024-12-04 PROCEDURE — 78815 PET IMAGE W/CT SKULL-THIGH: CPT | Mod: 26,PS,, | Performed by: STUDENT IN AN ORGANIZED HEALTH CARE EDUCATION/TRAINING PROGRAM

## 2024-12-04 PROCEDURE — A9596 HC GALLIUM GA-68 GOZETOTIDE, DX (ILLUCCIX), PER 1 MCI: HCPCS | Mod: TB | Performed by: RADIOLOGY

## 2024-12-04 PROCEDURE — 1160F RVW MEDS BY RX/DR IN RCRD: CPT | Mod: CPTII,S$GLB,, | Performed by: RADIOLOGY

## 2024-12-04 PROCEDURE — 1126F AMNT PAIN NOTED NONE PRSNT: CPT | Mod: CPTII,S$GLB,, | Performed by: RADIOLOGY

## 2024-12-04 PROCEDURE — 3077F SYST BP >= 140 MM HG: CPT | Mod: CPTII,S$GLB,, | Performed by: RADIOLOGY

## 2024-12-04 PROCEDURE — 1101F PT FALLS ASSESS-DOCD LE1/YR: CPT | Mod: CPTII,S$GLB,, | Performed by: RADIOLOGY

## 2024-12-04 PROCEDURE — 1159F MED LIST DOCD IN RCRD: CPT | Mod: CPTII,S$GLB,, | Performed by: RADIOLOGY

## 2024-12-04 PROCEDURE — 99212 OFFICE O/P EST SF 10 MIN: CPT | Mod: S$GLB,,, | Performed by: RADIOLOGY

## 2024-12-04 PROCEDURE — 3008F BODY MASS INDEX DOCD: CPT | Mod: CPTII,S$GLB,, | Performed by: RADIOLOGY

## 2024-12-04 PROCEDURE — 3288F FALL RISK ASSESSMENT DOCD: CPT | Mod: CPTII,S$GLB,, | Performed by: RADIOLOGY

## 2024-12-04 PROCEDURE — 3080F DIAST BP >= 90 MM HG: CPT | Mod: CPTII,S$GLB,, | Performed by: RADIOLOGY

## 2024-12-04 PROCEDURE — 78815 PET IMAGE W/CT SKULL-THIGH: CPT | Mod: TC

## 2024-12-04 RX ADMIN — KIT FOR THE PREPARATION OF GALLIUM GA 68 GOZETOTIDE INJECTION 5.08 MILLICURIE: KIT INTRAVENOUS at 12:12

## 2024-12-04 NOTE — Clinical Note
plan repeat PSA in 4 months he can get the PSA locally.  Will determine next step depending on the results.

## 2024-12-05 NOTE — PROGRESS NOTES
[facility] - Radiation Oncology     Patient ID: Bert Knott is a 69 y.o. male.    Chief Complaint: Prostate Cancer (4 mo f/u;psa & rev PSMA scan)      Mr. Knott has pathologic Stage IIIB, (pT3a, N0, M0, P<10, G1) adenocarcinoma of the prostate with biochemical failure.   He presented in 2017 with an elevated PSA of 6.7 ng/ml. Biopsies revealed Hira 7 (3+4) adenocarcinoma involving the Rt. mid gland and Rt. apex. He underwent RALP in August of 2017. Pathology revealed [place] 6 (3+3) adenocarcinoma with evidence of extraprostatic extension and perineural invasion. Postoperative PSA remained < 0.01 ng/ml until February of 2021 when it returned at 0.07 ng/ml. Repeat PSA on 9/27/22 returned at 0.32 ng/ml. PSMA scan on 11/2/22 revealed postoperative changes with faint focal activity at the vesicourethral anastomosis. He completed salvage irradiation with 6 month Lupron injection in March of 2023.  Post therapy his PSA has been increasing. Today the patient states he feels well.  No complaints.       Review of Systems   Constitutional:  Negative for activity change, appetite change, chills and fatigue.   Gastrointestinal:  Negative for change in bowel habit, constipation and diarrhea.   Genitourinary:  Negative for bladder incontinence, difficulty urinating, dysuria, frequency and hematuria.     Physical Exam  Constitutional:       General: He is not in acute distress.     Appearance: Normal appearance.   Neurological:      Mental Status: He is alert and oriented to person, place, and time.   Psychiatric:         Mood and Affect: Mood normal.         Judgment: Judgment normal.        Latest Reference Range & Units 05/02/24 09:09 09/05/24 10:13 12/04/24 10:48   PSA Diagnostic 0.00 - 4.00 ng/mL 0.04 0.13 0.20     PSMA scan today revealed no focal abnormal tracer uptake to suggest local recurrent or metastatic prostate cancer.          Assessment and Plan    Recurrent prostate cancer.   Discussed the results of his PSA and PSMA scan.  Will plan to continue active surveillance.  Plan follow up PSA in 4 months with possible PSMA scan depending on the results.

## 2025-06-17 ENCOUNTER — TELEPHONE (OUTPATIENT)
Dept: RADIATION ONCOLOGY | Facility: CLINIC | Age: 70
End: 2025-06-17
Payer: MEDICARE

## 2025-06-18 ENCOUNTER — LAB VISIT (OUTPATIENT)
Dept: LAB | Facility: HOSPITAL | Age: 70
End: 2025-06-18
Attending: RADIOLOGY
Payer: MEDICARE

## 2025-06-18 DIAGNOSIS — C61 PROSTATE CANCER: ICD-10-CM

## 2025-06-18 LAB — PSA SERPL-MCNC: 0.28 NG/ML

## 2025-06-18 PROCEDURE — 84153 ASSAY OF PSA TOTAL: CPT

## 2025-06-18 PROCEDURE — 36415 COLL VENOUS BLD VENIPUNCTURE: CPT

## 2025-06-19 ENCOUNTER — RESULTS FOLLOW-UP (OUTPATIENT)
Dept: RADIATION ONCOLOGY | Facility: CLINIC | Age: 70
End: 2025-06-19

## 2025-06-19 DIAGNOSIS — C61 PROSTATE CANCER: Primary | ICD-10-CM

## 2025-06-26 ENCOUNTER — TELEPHONE (OUTPATIENT)
Dept: RADIATION ONCOLOGY | Facility: CLINIC | Age: 70
End: 2025-06-26
Payer: MEDICARE

## 2025-06-27 ENCOUNTER — TELEPHONE (OUTPATIENT)
Dept: RADIATION ONCOLOGY | Facility: CLINIC | Age: 70
End: 2025-06-27
Payer: MEDICARE

## 2025-07-22 ENCOUNTER — OFFICE VISIT (OUTPATIENT)
Dept: RADIATION ONCOLOGY | Facility: CLINIC | Age: 70
End: 2025-07-22
Payer: MEDICARE

## 2025-07-22 ENCOUNTER — HOSPITAL ENCOUNTER (OUTPATIENT)
Dept: RADIOLOGY | Facility: HOSPITAL | Age: 70
Discharge: HOME | End: 2025-07-22
Attending: RADIOLOGY
Payer: MEDICARE

## 2025-07-22 VITALS
WEIGHT: 217.81 LBS | DIASTOLIC BLOOD PRESSURE: 86 MMHG | BODY MASS INDEX: 30.49 KG/M2 | SYSTOLIC BLOOD PRESSURE: 155 MMHG | HEIGHT: 71 IN | HEART RATE: 67 BPM

## 2025-07-22 DIAGNOSIS — C61 PROSTATE CANCER: Primary | ICD-10-CM

## 2025-07-22 DIAGNOSIS — C61 PROSTATE CANCER: ICD-10-CM

## 2025-07-22 PROCEDURE — 99212 OFFICE O/P EST SF 10 MIN: CPT | Mod: S$GLB,,, | Performed by: RADIOLOGY

## 2025-07-22 PROCEDURE — 3008F BODY MASS INDEX DOCD: CPT | Mod: CPTII,S$GLB,, | Performed by: RADIOLOGY

## 2025-07-22 PROCEDURE — 4010F ACE/ARB THERAPY RXD/TAKEN: CPT | Mod: CPTII,S$GLB,, | Performed by: RADIOLOGY

## 2025-07-22 PROCEDURE — A9596 HC GALLIUM GA-68 GOZETOTIDE, DX (ILLUCCIX), PER 1 MCI: HCPCS | Mod: TB | Performed by: RADIOLOGY

## 2025-07-22 PROCEDURE — 1159F MED LIST DOCD IN RCRD: CPT | Mod: CPTII,S$GLB,, | Performed by: RADIOLOGY

## 2025-07-22 PROCEDURE — 3079F DIAST BP 80-89 MM HG: CPT | Mod: CPTII,S$GLB,, | Performed by: RADIOLOGY

## 2025-07-22 PROCEDURE — 3077F SYST BP >= 140 MM HG: CPT | Mod: CPTII,S$GLB,, | Performed by: RADIOLOGY

## 2025-07-22 PROCEDURE — 1126F AMNT PAIN NOTED NONE PRSNT: CPT | Mod: CPTII,S$GLB,, | Performed by: RADIOLOGY

## 2025-07-22 PROCEDURE — 99999 PR PBB SHADOW E&M-EST. PATIENT-LVL III: CPT | Mod: PBBFAC,,, | Performed by: RADIOLOGY

## 2025-07-22 PROCEDURE — 1101F PT FALLS ASSESS-DOCD LE1/YR: CPT | Mod: CPTII,S$GLB,, | Performed by: RADIOLOGY

## 2025-07-22 PROCEDURE — 1160F RVW MEDS BY RX/DR IN RCRD: CPT | Mod: CPTII,S$GLB,, | Performed by: RADIOLOGY

## 2025-07-22 PROCEDURE — 3288F FALL RISK ASSESSMENT DOCD: CPT | Mod: CPTII,S$GLB,, | Performed by: RADIOLOGY

## 2025-07-22 PROCEDURE — 78815 PET IMAGE W/CT SKULL-THIGH: CPT | Mod: 26,PS,, | Performed by: STUDENT IN AN ORGANIZED HEALTH CARE EDUCATION/TRAINING PROGRAM

## 2025-07-22 PROCEDURE — 78815 PET IMAGE W/CT SKULL-THIGH: CPT | Mod: TC

## 2025-07-22 RX ORDER — GLIPIZIDE 2.5 MG/1
2.5 TABLET ORAL
COMMUNITY
Start: 2025-07-03

## 2025-07-22 RX ORDER — FLUTICASONE PROPIONATE 50 MCG
2 SPRAY, SUSPENSION (ML) NASAL
COMMUNITY
Start: 2025-05-28

## 2025-07-22 RX ADMIN — KIT FOR THE PREPARATION OF GALLIUM GA 68 GOZETOTIDE INJECTION 4.84 MILLICURIE: KIT INTRAVENOUS at 12:07

## 2025-07-24 NOTE — PROGRESS NOTES
[facility] - Radiation Oncology     Patient ID: Bert Knott is a 70 y.o. male.    Chief Complaint: Prostate Cancer (6 f/u;PET )      Mr. Knott has pathologic Stage IIIB, (pT3a, N0, M0, P<10, G1) adenocarcinoma of the prostate with biochemical failure.   He presented in 2017 with an elevated PSA of 6.7 ng/ml. Biopsies revealed Hira 7 (3+4) adenocarcinoma involving the Rt. mid gland and Rt. apex. He underwent RALP in August of 2017. Pathology revealed Hira 6 (3+3) adenocarcinoma with evidence of extraprostatic extension and perineural invasion. Postoperative PSA remained < 0.01 ng/ml until February of 2021 when it returned at 0.07 ng/ml. Repeat PSA on 9/27/22 returned at 0.32 ng/ml. PSMA scan on 11/2/22 revealed postoperative changes with faint focal activity at the vesicourethral anastomosis. He completed salvage irradiation with 6 month Lupron injection in March of 2023.  Post therapy his PSA has been increasing. Today the patient states he feels well.  No complaints.       Review of Systems   Constitutional:  Negative for activity change, appetite change, chills and fatigue.   Genitourinary:  Negative for bladder incontinence, difficulty urinating, dysuria, frequency and hematuria.     Physical Exam  Constitutional:       General: He is not in acute distress.     Appearance: Normal appearance.   Neurological:      Mental Status: He is alert and oriented to person, place, and time.   Psychiatric:         Mood and Affect: Mood normal.         Judgment: Judgment normal.          Latest Reference Range & Units 09/05/24 10:13 12/04/24 10:48 06/18/25 13:29   Prostate Specific Antigen <=4.00 ng/mL   0.28   PSA Diagnostic 0.00 - 4.00 ng/mL 0.13 0.20      PET scan today revealed no tracer avid lesions to suggest local recurrence or metastatic disease.        Assessment and Plan    Plan follow up PSA in 3 months with phone review.

## (undated) DEVICE — TRAY FOLEY 16FR INFECTION CONT

## (undated) DEVICE — NDL INSUF ULTRA VERESS 120MM

## (undated) DEVICE — ADHESIVE DERMABOND ADVANCED

## (undated) DEVICE — DERMABOND SKIN ADHESIVE PROPEN

## (undated) DEVICE — SCISSOR 5MMX35CM DIRECT DRIVE

## (undated) DEVICE — CORD BIPOLAR 12 FOOT

## (undated) DEVICE — TROCAR ENDOPATH XCEL 5MM 7.5CM

## (undated) DEVICE — TROCAR ENDOPATH XCEL 12X100MM

## (undated) DEVICE — DRAPE SCOPE PILLOW WARMER

## (undated) DEVICE — SUT MCRYL PLUS 4-0 PS2 27IN

## (undated) DEVICE — SOL NS 1000CC

## (undated) DEVICE — IRRIGATOR ENDOSCOPY DISP.

## (undated) DEVICE — TROCAR ENDOPATH XCEL 5X100MM

## (undated) DEVICE — Device

## (undated) DEVICE — SOL ELECTROLUBE ANTI-STIC

## (undated) DEVICE — PORT AIRSEAL 12/120MM LPI

## (undated) DEVICE — TRAY MINOR GEN SURG

## (undated) DEVICE — SOL WATER STRL IRR 1000ML

## (undated) DEVICE — BLADE SURG CARBON STEEL SZ11

## (undated) DEVICE — SUT PDS II 0 CT-1 VIL MONO

## (undated) DEVICE — CLIP HEMO-LOK MLX LARGE LF

## (undated) DEVICE — COVER TIP CURVED SCISSORS XI

## (undated) DEVICE — SYR 50ML CATH TIP

## (undated) DEVICE — SUT 2/0 36IN COATED VICRYL

## (undated) DEVICE — SET TRI-LUMEN FILTERED TUBE

## (undated) DEVICE — LEGGINGS 48X31 INCH

## (undated) DEVICE — DRAPE ABDOMINAL TIBURON 14X11

## (undated) DEVICE — SEE MEDLINE ITEM 146292

## (undated) DEVICE — SUT MONOCRYL 3-0 RB1

## (undated) DEVICE — ELECTRODE REM PLYHSV RETURN 9

## (undated) DEVICE — COVER LIGHT HANDLE

## (undated) DEVICE — SUT MONOCRYL 3-0 UNDYED RB1

## (undated) DEVICE — KIT ANTIFOG

## (undated) DEVICE — NDL HYPODERMIC BLUNT 18G 1.5IN

## (undated) DEVICE — KIT ROBOTIC 4 ARM DA VINCI SI

## (undated) DEVICE — BAG TISS RETRV MONARCH 10MM

## (undated) DEVICE — LUBRICANT SURGILUBE 2 OZ